# Patient Record
Sex: FEMALE | URBAN - METROPOLITAN AREA
[De-identification: names, ages, dates, MRNs, and addresses within clinical notes are randomized per-mention and may not be internally consistent; named-entity substitution may affect disease eponyms.]

---

## 2019-02-05 ENCOUNTER — INPATIENT (INPATIENT)
Facility: HOSPITAL | Age: 54
LOS: 2 days | Discharge: HOME | DRG: 305 | End: 2019-02-08
Attending: INTERNAL MEDICINE | Admitting: INTERNAL MEDICINE
Payer: MEDICAID

## 2019-02-05 VITALS
TEMPERATURE: 98 F | WEIGHT: 167.99 LBS | RESPIRATION RATE: 17 BRPM | DIASTOLIC BLOOD PRESSURE: 86 MMHG | SYSTOLIC BLOOD PRESSURE: 185 MMHG | HEART RATE: 77 BPM | HEIGHT: 60 IN | OXYGEN SATURATION: 99 %

## 2019-02-05 DIAGNOSIS — I10 ESSENTIAL (PRIMARY) HYPERTENSION: ICD-10-CM

## 2019-02-05 DIAGNOSIS — I21.9 ACUTE MYOCARDIAL INFARCTION, UNSPECIFIED: ICD-10-CM

## 2019-02-05 DIAGNOSIS — Z95.5 PRESENCE OF CORONARY ANGIOPLASTY IMPLANT AND GRAFT: Chronic | ICD-10-CM

## 2019-02-05 DIAGNOSIS — G51.0 BELL'S PALSY: ICD-10-CM

## 2019-02-05 DIAGNOSIS — H53.8 OTHER VISUAL DISTURBANCES: ICD-10-CM

## 2019-02-05 DIAGNOSIS — Z29.9 ENCOUNTER FOR PROPHYLACTIC MEASURES, UNSPECIFIED: ICD-10-CM

## 2019-02-05 LAB
ALBUMIN SERPL ELPH-MCNC: 3.6 G/DL — SIGNIFICANT CHANGE UP (ref 3.5–5)
ALP SERPL-CCNC: 95 U/L — SIGNIFICANT CHANGE UP (ref 40–120)
ALT FLD-CCNC: 37 U/L DA — SIGNIFICANT CHANGE UP (ref 10–60)
ANION GAP SERPL CALC-SCNC: 8 MMOL/L — SIGNIFICANT CHANGE UP (ref 5–17)
APTT BLD: 29.7 SEC — SIGNIFICANT CHANGE UP (ref 27.5–36.3)
AST SERPL-CCNC: 31 U/L — SIGNIFICANT CHANGE UP (ref 10–40)
BASOPHILS # BLD AUTO: 0.1 K/UL — SIGNIFICANT CHANGE UP (ref 0–0.2)
BASOPHILS NFR BLD AUTO: 0.9 % — SIGNIFICANT CHANGE UP (ref 0–2)
BILIRUB SERPL-MCNC: 0.4 MG/DL — SIGNIFICANT CHANGE UP (ref 0.2–1.2)
BUN SERPL-MCNC: 6 MG/DL — LOW (ref 7–18)
CALCIUM SERPL-MCNC: 9 MG/DL — SIGNIFICANT CHANGE UP (ref 8.4–10.5)
CHLORIDE SERPL-SCNC: 101 MMOL/L — SIGNIFICANT CHANGE UP (ref 96–108)
CK MB BLD-MCNC: <4 % — HIGH (ref 0–3.5)
CK MB CFR SERPL CALC: <1 NG/ML — SIGNIFICANT CHANGE UP (ref 0–3.6)
CK SERPL-CCNC: 25 U/L — SIGNIFICANT CHANGE UP (ref 21–215)
CO2 SERPL-SCNC: 26 MMOL/L — SIGNIFICANT CHANGE UP (ref 22–31)
CREAT SERPL-MCNC: 0.82 MG/DL — SIGNIFICANT CHANGE UP (ref 0.5–1.3)
EOSINOPHIL # BLD AUTO: 0.1 K/UL — SIGNIFICANT CHANGE UP (ref 0–0.5)
EOSINOPHIL NFR BLD AUTO: 1.2 % — SIGNIFICANT CHANGE UP (ref 0–6)
GLUCOSE SERPL-MCNC: 373 MG/DL — HIGH (ref 70–99)
HCT VFR BLD CALC: 48.2 % — HIGH (ref 34.5–45)
HGB BLD-MCNC: 15.8 G/DL — HIGH (ref 11.5–15.5)
INR BLD: 1.04 RATIO — SIGNIFICANT CHANGE UP (ref 0.88–1.16)
LYMPHOCYTES # BLD AUTO: 3 K/UL — SIGNIFICANT CHANGE UP (ref 1–3.3)
LYMPHOCYTES # BLD AUTO: 33.5 % — SIGNIFICANT CHANGE UP (ref 13–44)
MCHC RBC-ENTMCNC: 28.4 PG — SIGNIFICANT CHANGE UP (ref 27–34)
MCHC RBC-ENTMCNC: 32.8 GM/DL — SIGNIFICANT CHANGE UP (ref 32–36)
MCV RBC AUTO: 86.6 FL — SIGNIFICANT CHANGE UP (ref 80–100)
MONOCYTES # BLD AUTO: 0.7 K/UL — SIGNIFICANT CHANGE UP (ref 0–0.9)
MONOCYTES NFR BLD AUTO: 8.2 % — SIGNIFICANT CHANGE UP (ref 2–14)
NEUTROPHILS # BLD AUTO: 5 K/UL — SIGNIFICANT CHANGE UP (ref 1.8–7.4)
NEUTROPHILS NFR BLD AUTO: 56.3 % — SIGNIFICANT CHANGE UP (ref 43–77)
PLATELET # BLD AUTO: 267 K/UL — SIGNIFICANT CHANGE UP (ref 150–400)
POTASSIUM SERPL-MCNC: 3.1 MMOL/L — LOW (ref 3.5–5.3)
POTASSIUM SERPL-SCNC: 3.1 MMOL/L — LOW (ref 3.5–5.3)
PROT SERPL-MCNC: 8.2 G/DL — SIGNIFICANT CHANGE UP (ref 6–8.3)
PROTHROM AB SERPL-ACNC: 11.6 SEC — SIGNIFICANT CHANGE UP (ref 10–12.9)
RBC # BLD: 5.57 M/UL — HIGH (ref 3.8–5.2)
RBC # FLD: 12.2 % — SIGNIFICANT CHANGE UP (ref 10.3–14.5)
SODIUM SERPL-SCNC: 135 MMOL/L — SIGNIFICANT CHANGE UP (ref 135–145)
TROPONIN I SERPL-MCNC: 0.02 NG/ML — SIGNIFICANT CHANGE UP (ref 0–0.04)
WBC # BLD: 9 K/UL — SIGNIFICANT CHANGE UP (ref 3.8–10.5)
WBC # FLD AUTO: 9 K/UL — SIGNIFICANT CHANGE UP (ref 3.8–10.5)

## 2019-02-05 PROCEDURE — 70450 CT HEAD/BRAIN W/O DYE: CPT | Mod: 26

## 2019-02-05 PROCEDURE — 99285 EMERGENCY DEPT VISIT HI MDM: CPT

## 2019-02-05 RX ORDER — ATORVASTATIN CALCIUM 80 MG/1
10 TABLET, FILM COATED ORAL AT BEDTIME
Qty: 0 | Refills: 0 | Status: DISCONTINUED | OUTPATIENT
Start: 2019-02-05 | End: 2019-02-05

## 2019-02-05 RX ORDER — ATORVASTATIN CALCIUM 80 MG/1
40 TABLET, FILM COATED ORAL AT BEDTIME
Qty: 0 | Refills: 0 | Status: DISCONTINUED | OUTPATIENT
Start: 2019-02-05 | End: 2019-02-08

## 2019-02-05 RX ORDER — ASPIRIN/CALCIUM CARB/MAGNESIUM 324 MG
81 TABLET ORAL DAILY
Qty: 0 | Refills: 0 | Status: DISCONTINUED | OUTPATIENT
Start: 2019-02-05 | End: 2019-02-08

## 2019-02-05 RX ORDER — ASPIRIN/CALCIUM CARB/MAGNESIUM 324 MG
325 TABLET ORAL ONCE
Qty: 0 | Refills: 0 | Status: COMPLETED | OUTPATIENT
Start: 2019-02-05 | End: 2019-02-05

## 2019-02-05 RX ORDER — HEPARIN SODIUM 5000 [USP'U]/ML
5000 INJECTION INTRAVENOUS; SUBCUTANEOUS EVERY 8 HOURS
Qty: 0 | Refills: 0 | Status: DISCONTINUED | OUTPATIENT
Start: 2019-02-05 | End: 2019-02-08

## 2019-02-05 RX ADMIN — HEPARIN SODIUM 5000 UNIT(S): 5000 INJECTION INTRAVENOUS; SUBCUTANEOUS at 22:12

## 2019-02-05 RX ADMIN — Medication 325 MILLIGRAM(S): at 18:58

## 2019-02-05 RX ADMIN — ATORVASTATIN CALCIUM 40 MILLIGRAM(S): 80 TABLET, FILM COATED ORAL at 22:12

## 2019-02-05 NOTE — H&P ADULT - NEGATIVE OPHTHALMOLOGIC SYMPTOMS
no photophobia/no discharge R/no discharge L/no lacrimation L/no lacrimation R/no loss of vision L/no blurred vision L/no loss of vision R

## 2019-02-05 NOTE — ED PROVIDER NOTE - MEDICAL DECISION MAKING DETAILS
52 y/o F presents with blurry vision and strabismus on examination. Will obtain CT head, labs, discuss with neurology and reassess.

## 2019-02-05 NOTE — H&P ADULT - HISTORY OF PRESENT ILLNESS
This is a 54 y/o female, from home lives with daughter ambulates independently, with PMH of R sided Bell's Palsy in 2017 with some residual deficit, MI in 2004 s/p stent, HLD, HTN, noncompliant with medications and no PCP, who p/w change in right sided vision for 3 days. She says that on Sunday she noticed she was unable to focus her vision due to inability to focus her right eye. She reports that on Saturday she had a headache for which she took tylenol and it went away, but then she noticed the vision change the following day. She reports that the inability to focus her right eye makes her feel off balance. She says that she has not had any problems with her left eye. She does not notice a change in her appearance of her face. She denies any associated pain, loss of sensation of the face or elsewhere on the body, numbness, weakness, tingling, LOC, falls, dizziness, headaches aside from Saturday, nausea, vomiting, diarrhea, chest pain, SOB, fevers, chills, trauma, recent illness. She reports that she used to smoke 1 pack of cigarettes per week for 10 years until 2004. She reports that she drinks alcohol in moderation socially and her last drink was 3 weeks ago.

## 2019-02-05 NOTE — H&P ADULT - NEGATIVE NEUROLOGICAL SYMPTOMS
no weakness/no syncope/no vertigo/no loss of sensation/no loss of consciousness/no transient paralysis/no headache

## 2019-02-05 NOTE — H&P ADULT - PROBLEM SELECTOR PLAN 1
R sided vision change, inability to focus, x3 days  - May be secondary to brainstem stroke  - Right sided facial droop, may be due to residual deficits from Bell's palsy in 2017  - Admit to tele for concern of stroke  - CT head negative  - Start on aspirin, statin  - F/u cardiac enzymes  - F/u echo  - Neuro Dr Reis  - Cardio Dr Carter R sided vision change, inability to focus, x3 days  - May be secondary to brainstem stroke  - Right sided facial droop, may be due to residual deficits from Bell's palsy in 2017  - Admit to tele for concern of stroke  - Permissive HTN to 220/110 for 48 hours  - CT head negative  - Start on aspirin, statin  - F/u cardiac enzymes  - F/u carotid doppler  - F/u echo  - Neuro Dr Reis  - Cardio Dr Carter

## 2019-02-05 NOTE — ED PROVIDER NOTE - NS ED ROS FT
CONSTITUTIONAL: no fever, no chills   EYES: +visual changes, no eye pain   ENMT: no nasal congestion, no throat pain  CARDIOVASCULAR: no chest pain, no edema, no palpitations   RESPIRATORY: no shortness of breath, no cough   GASTROINTESTINAL: no abdominal pain, no nausea, no vomiting, no diarrhea, no constipation   GENITOURINARY: no dysuria, no frequency  MUSCULOSKELETAL: no joint pains, no myalgias, no back pain   SKIN: no rashes  NEUROLOGICAL: no weakness, no headache, no dizziness, no slurred speech, no syncope   PSYCHIATRIC: no known mental health illness   HEME/LYMPH: no lymphadenopathy      All other ROS negative except as per HPI

## 2019-02-05 NOTE — ED PROVIDER NOTE - OBJECTIVE STATEMENT
52 y/o F with PMHx of R sided bell's palsy, CAD with stents presents to the ED with complaints of blurry vision x 2 days. Patient states that vision is normal when looking from one eye, but blurry when looking for both. Symptoms have been constant since onset and is associated with feeling unsteady with walking. Patient denies headache, eye pain, slurred speech, syncope, trauma or any other acute complaints. NKDA. 54 y/o F with PMHx of R sided bell's palsy, CAD with stents presents to the ED with complaints of blurry vision x 2 days. Patient states that vision is normal when looking from one eye, but blurry when looking for both. Symptoms have been constant since onset and is associated with feeling unsteady with walking. Patient denies headache, eye pain, slurred speech, syncope, trauma or any other acute complaints.

## 2019-02-05 NOTE — H&P ADULT - PROBLEM SELECTOR PLAN 2
History of Bell's palsy in 2017  - Patient believes she may have some residual deficits, unclear if current facial droop is due to Bell's palsy vs new stroke  - Neuro Dr Reis

## 2019-02-05 NOTE — H&P ADULT - PROBLEM SELECTOR PLAN 4
History of HTN, not on meds, does not have PCP  - BP elevated on presentation  - Cardio Dr Carter History of HTN, not on meds, does not have PCP  - BP elevated on presentation  - Permissive HTN for now  - Cardio Dr Carter

## 2019-02-05 NOTE — H&P ADULT - PROBLEM SELECTOR PLAN 3
History of MI in 2004, s/p stents  - Noncompliant with medications  - Start on aspirin, statin  - Cardio Dr Carter History of MI in 2004, s/p stents  - Noncompliant with medications  - Start on aspirin, statin, hold off on plavix for now pending stroke w/u  - F/u echo  - Cardio Dr Carter

## 2019-02-05 NOTE — H&P ADULT - ASSESSMENT
This is a 52 y/o female, from home lives with daughter ambulates independently, with PMH of R sided Bell's Palsy in 2017 with some residual deficit, MI in 2004 s/p stent, HLD, HTN, noncompliant with medications and no PCP, who p/w change in right sided vision for 3 days. She says that on Sunday she noticed she was unable to focus her vision due to inability to focus her right eye. She reports that on Saturday she had a headache for which she took tylenol and it went away, but then she noticed the vision change the following day. She reports that the inability to focus her right eye makes her feel off balance. She says that she has not had any problems with her left eye. She does not notice a change in her appearance of her face. She denies any associated pain, loss of sensation of the face or elsewhere on the body, numbness, weakness, tingling, LOC, falls, dizziness, headaches aside from Saturday, nausea, vomiting, diarrhea, chest pain, SOB, fevers, chills, trauma, recent illness. She reports that she used to smoke 1 pack of cigarettes per week for 10 years until 2004. She reports that she drinks alcohol in moderation socially and her last drink was 3 weeks ago. This is a 52 y/o female, from home lives with daughter ambulates independently, with PMH of R sided Bell's Palsy in 2017 with some residual deficit, MI in 2004 s/p stent, HLD, HTN, noncompliant with medications and no PCP, who p/w change in right sided vision for 3 days. She says that on Sunday she noticed she was unable to focus her vision due to inability to focus her right eye. She reports that on Saturday she had a headache for which she took tylenol and it went away, but then she noticed the vision change the following day. She reports that the inability to focus her right eye makes her feel off balance. She says that she has not had any problems with her left eye. She does not notice a change in her appearance of her face. She denies any associated pain, loss of sensation of the face or elsewhere on the body, numbness, weakness, tingling, LOC, falls, dizziness, headaches aside from Saturday, nausea, vomiting, diarrhea, chest pain, SOB, fevers, chills, trauma, recent illness. She reports that she used to smoke 1 pack of cigarettes per week for 10 years until 2004. She reports that she drinks alcohol in moderation socially and her last drink was 3 weeks ago.    Admitting patient to telemetry to r/o brainstem stroke.

## 2019-02-05 NOTE — H&P ADULT - PROBLEM SELECTOR PLAN 5
IMPROVE VTE Individual Risk Assessment    RISK                                                          Points  [] Previous VTE                                          3  [] Thrombophilia                                         2  [] Lower limb paralysis                                2   [] Current Cancer                                        2   [] Immobilization > 24 hrs                          1  [] ICU/CCU stay > 24 hours                          1  [] Age > 60                                                1    Improve Score 0.  No DVT ppx indicated for now. IMPROVE VTE Individual Risk Assessment    RISK                                                          Points  [] Previous VTE                                          3  [] Thrombophilia                                         2  [] Lower limb paralysis                                2   [] Current Cancer                                        2   [x] Immobilization > 24 hrs                          1  [] ICU/CCU stay > 24 hours                          1  [] Age > 60                                                1    Improve Score 1.  HSQ for DVT ppx.

## 2019-02-05 NOTE — ED PROVIDER NOTE - PROGRESS NOTE DETAILS
labs - hyperglycemia, borderline hypoK  CT head - no hemorrhage or acute infarct   Discussed case with Dr Reis / neuro; states symptoms may be due to brain stem infarct. Will admit to unattached / Dr Rosen for further management. Given aspirin. Passes dysphagia. NIH 0. Endorsed to MAR.

## 2019-02-05 NOTE — H&P ADULT - NSHPSOCIALHISTORY_GEN_ALL_CORE
From home lives with daughter, ambulates independently.  Uninsured, no PCP, noncompliant with meds.  1 pack per week smoking history for 10 years until 2004.  Social drinker, last drink 3 weeks ago.

## 2019-02-06 DIAGNOSIS — E11.9 TYPE 2 DIABETES MELLITUS WITHOUT COMPLICATIONS: ICD-10-CM

## 2019-02-06 DIAGNOSIS — E03.9 HYPOTHYROIDISM, UNSPECIFIED: ICD-10-CM

## 2019-02-06 LAB
ANION GAP SERPL CALC-SCNC: 8 MMOL/L — SIGNIFICANT CHANGE UP (ref 5–17)
BUN SERPL-MCNC: 5 MG/DL — LOW (ref 7–18)
CALCIUM SERPL-MCNC: 8.5 MG/DL — SIGNIFICANT CHANGE UP (ref 8.4–10.5)
CHLORIDE SERPL-SCNC: 100 MMOL/L — SIGNIFICANT CHANGE UP (ref 96–108)
CHOLEST SERPL-MCNC: 176 MG/DL — SIGNIFICANT CHANGE UP (ref 10–199)
CK MB BLD-MCNC: <3.8 % — HIGH (ref 0–3.5)
CK MB CFR SERPL CALC: <1 NG/ML — SIGNIFICANT CHANGE UP (ref 0–3.6)
CK SERPL-CCNC: 26 U/L — SIGNIFICANT CHANGE UP (ref 21–215)
CO2 SERPL-SCNC: 28 MMOL/L — SIGNIFICANT CHANGE UP (ref 22–31)
CREAT SERPL-MCNC: 0.71 MG/DL — SIGNIFICANT CHANGE UP (ref 0.5–1.3)
GLUCOSE SERPL-MCNC: 253 MG/DL — HIGH (ref 70–99)
HBA1C BLD-MCNC: 13.1 % — HIGH (ref 4–5.6)
HCT VFR BLD CALC: 46.2 % — HIGH (ref 34.5–45)
HCV AB S/CO SERPL IA: 0.21 S/CO — SIGNIFICANT CHANGE UP
HCV AB SERPL-IMP: SIGNIFICANT CHANGE UP
HDLC SERPL-MCNC: 32 MG/DL — LOW
HGB BLD-MCNC: 15.2 G/DL — SIGNIFICANT CHANGE UP (ref 11.5–15.5)
LIPID PNL WITH DIRECT LDL SERPL: 110 MG/DL — SIGNIFICANT CHANGE UP
MAGNESIUM SERPL-MCNC: 2 MG/DL — SIGNIFICANT CHANGE UP (ref 1.6–2.6)
MCHC RBC-ENTMCNC: 29 PG — SIGNIFICANT CHANGE UP (ref 27–34)
MCHC RBC-ENTMCNC: 32.8 GM/DL — SIGNIFICANT CHANGE UP (ref 32–36)
MCV RBC AUTO: 88.2 FL — SIGNIFICANT CHANGE UP (ref 80–100)
PHOSPHATE SERPL-MCNC: 2.6 MG/DL — SIGNIFICANT CHANGE UP (ref 2.5–4.5)
PLATELET # BLD AUTO: 237 K/UL — SIGNIFICANT CHANGE UP (ref 150–400)
POTASSIUM SERPL-MCNC: 3.4 MMOL/L — LOW (ref 3.5–5.3)
POTASSIUM SERPL-SCNC: 3.4 MMOL/L — LOW (ref 3.5–5.3)
RBC # BLD: 5.24 M/UL — HIGH (ref 3.8–5.2)
RBC # FLD: 11.9 % — SIGNIFICANT CHANGE UP (ref 10.3–14.5)
SODIUM SERPL-SCNC: 136 MMOL/L — SIGNIFICANT CHANGE UP (ref 135–145)
TOTAL CHOLESTEROL/HDL RATIO MEASUREMENT: 5.5 RATIO — SIGNIFICANT CHANGE UP (ref 3.3–7.1)
TRIGL SERPL-MCNC: 169 MG/DL — HIGH (ref 10–149)
TROPONIN I SERPL-MCNC: 0.03 NG/ML — SIGNIFICANT CHANGE UP (ref 0–0.04)
TSH SERPL-MCNC: 19.3 UU/ML — HIGH (ref 0.34–4.82)
WBC # BLD: 8.9 K/UL — SIGNIFICANT CHANGE UP (ref 3.8–10.5)
WBC # FLD AUTO: 8.9 K/UL — SIGNIFICANT CHANGE UP (ref 3.8–10.5)

## 2019-02-06 PROCEDURE — 99222 1ST HOSP IP/OBS MODERATE 55: CPT

## 2019-02-06 PROCEDURE — 93880 EXTRACRANIAL BILAT STUDY: CPT | Mod: 26

## 2019-02-06 RX ORDER — INSULIN LISPRO 100/ML
VIAL (ML) SUBCUTANEOUS
Qty: 0 | Refills: 0 | Status: DISCONTINUED | OUTPATIENT
Start: 2019-02-06 | End: 2019-02-08

## 2019-02-06 RX ORDER — LISINOPRIL 2.5 MG/1
10 TABLET ORAL DAILY
Qty: 0 | Refills: 0 | Status: DISCONTINUED | OUTPATIENT
Start: 2019-02-06 | End: 2019-02-07

## 2019-02-06 RX ORDER — HYDROCHLOROTHIAZIDE 25 MG
25 TABLET ORAL DAILY
Qty: 0 | Refills: 0 | Status: DISCONTINUED | OUTPATIENT
Start: 2019-02-06 | End: 2019-02-07

## 2019-02-06 RX ORDER — POTASSIUM CHLORIDE 20 MEQ
20 PACKET (EA) ORAL DAILY
Qty: 0 | Refills: 0 | Status: DISCONTINUED | OUTPATIENT
Start: 2019-02-06 | End: 2019-02-08

## 2019-02-06 RX ADMIN — Medication 25 MILLIGRAM(S): at 17:44

## 2019-02-06 RX ADMIN — Medication 20 MILLIEQUIVALENT(S): at 11:18

## 2019-02-06 RX ADMIN — ATORVASTATIN CALCIUM 40 MILLIGRAM(S): 80 TABLET, FILM COATED ORAL at 22:04

## 2019-02-06 RX ADMIN — Medication 2: at 17:17

## 2019-02-06 RX ADMIN — HEPARIN SODIUM 5000 UNIT(S): 5000 INJECTION INTRAVENOUS; SUBCUTANEOUS at 13:12

## 2019-02-06 RX ADMIN — Medication 81 MILLIGRAM(S): at 11:54

## 2019-02-06 RX ADMIN — Medication 3: at 11:55

## 2019-02-06 RX ADMIN — LISINOPRIL 10 MILLIGRAM(S): 2.5 TABLET ORAL at 17:44

## 2019-02-06 RX ADMIN — HEPARIN SODIUM 5000 UNIT(S): 5000 INJECTION INTRAVENOUS; SUBCUTANEOUS at 22:05

## 2019-02-06 RX ADMIN — HEPARIN SODIUM 5000 UNIT(S): 5000 INJECTION INTRAVENOUS; SUBCUTANEOUS at 07:20

## 2019-02-06 RX ADMIN — Medication 1 DROP(S): at 17:44

## 2019-02-06 NOTE — PROGRESS NOTE ADULT - PROBLEM SELECTOR PLAN 4
History of HTN, not on meds, does not have PCP  - BP elevated on presentation  - started on lisinopril and HCTZ  - Cardio Dr Carter

## 2019-02-06 NOTE — CONSULT NOTE ADULT - SUBJECTIVE AND OBJECTIVE BOX
Patient is a 53y old  Female who presents with a chief complaint of change in vision of R eye x3 days (06 Feb 2019 15:47)      HPI:    PAST MEDICAL & SURGICAL HISTORY:  Hypertension  Myocardial infarction  Bell's palsy  Coronary artery disease: 4 cardiac stents  H/O heart artery stent      FAMILY HISTORY:  No pertinent family history        Social Hx:  Nonsmoker, no drug or alcohol use    MEDICATIONS  (STANDING):  artificial  tears Solution 1 Drop(s) Both EYES daily  aspirin  chewable 81 milliGRAM(s) Oral daily  atorvastatin 40 milliGRAM(s) Oral at bedtime  heparin  Injectable 5000 Unit(s) SubCutaneous every 8 hours  hydrochlorothiazide 25 milliGRAM(s) Oral daily  insulin lispro (HumaLOG) corrective regimen sliding scale   SubCutaneous three times a day before meals  lisinopril 10 milliGRAM(s) Oral daily  potassium chloride    Tablet ER 20 milliEquivalent(s) Oral daily       Allergies    No Known Allergies    Intolerances        ROS: Pertinent positives in HPI, all other ROS were reviewed and are negative.      Vital Signs Last 24 Hrs  T(C): 36.7 (06 Feb 2019 15:18), Max: 36.9 (06 Feb 2019 11:08)  T(F): 98 (06 Feb 2019 15:18), Max: 98.4 (06 Feb 2019 11:08)  HR: 83 (06 Feb 2019 15:18) (57 - 85)  BP: 187/88 (06 Feb 2019 15:18) (165/87 - 187/88)  BP(mean): --  RR: 17 (06 Feb 2019 15:18) (17 - 18)  SpO2: 100% (06 Feb 2019 15:18) (97% - 100%)        Constitutional: awake and alert.  HEENT: PERRLA, EOMI,   Neck: Supple.  Respiratory: Breath sounds are clear bilaterally  Cardiovascular: S1 and S2, regular / irregular rhythm  Gastrointestinal: soft, nontender  Extremities:  no edema  Vascular: Caritid Bruit - no  Musculoskeletal: no joint swelling/tenderness, no abnormal movements  Skin: No rashes    Neurological exam:  HF: A x O x 3. Appropriately interactive, normal affect. Speech fluent, No Aphasia or paraphasic errors. Naming /repetition intact   CN: ANA PAULA, EOM Left internuclear ophthalmoplegia with abducting OD nystagmus and skew; ptosis OD, VFF, facial sensation normal, right peripheral type facial weakness tongue midline, Palate moves equally, SCM equal bilaterally  Motor: No pronator drift, Strength 5/5 in all 4 ext, normal bulk and tone, no tremor, rigidity or bradykinesia.    Sens: Intact to light touch / PP/ VS/ JS    Reflexes: Symmetric and normal . BJ 2+, BR 2+, KJ 1+, AJ 1+, downgoing toes b/l  Coord:  No FNFA, dysmetria, HUSSAIN intact   Gait/Balance: Normal    NIHSS: 0  MRS 6          Labs:                        15.2   8.9   )-----------( 237      ( 06 Feb 2019 08:26 )             46.2     02-06    136  |  100  |  5<L>  ----------------------------<  253<H>  3.4<L>   |  28  |  0.71    Ca    8.5      06 Feb 2019 08:26  Phos  2.6     02-06  Mg     2.0     02-06    TPro  8.2  /  Alb  3.6  /  TBili  0.4  /  DBili  x   /  AST  31  /  ALT  37  /  AlkPhos  95  02-05    02-06 [id number] 13.1    02-06 Chol 176  HDL 32<L> Trig 169<H>  PT/INR - ( 05 Feb 2019 15:19 )   PT: 11.6 sec;   INR: 1.04 ratio         PTT - ( 05 Feb 2019 15:19 )  PTT:29.7 sec    Radiology report:  - CT head:  < from: CT Head No Cont (02.05.19 @ 15:29) >  EXAM:  CT BRAIN                            PROCEDURE DATE:  02/05/2019          INTERPRETATION:  CT HEAD WITHOUT CONTRAST    INDICATION: 53 years old. Female. strabismus, blurry vision.    COMPARISON: None available.    TECHNIQUE: Noncontrast axialCT head was obtained from the skull base to   vertex.    FINDINGS:  No acute intracranial hemorrhage, mass effect or midline shift.  No CT evidence of acute large territory vascular infarct.  The ventricles and cortical sulci are within normal limits.    The visualized paranasal sinuses and mastoid air cells are well aerated.  No displaced calvarial fracture.    IMPRESSION:   No acute intracranial hemorrhage or mass effect.    EVELYN VEGA M.D., ATTENDING RADIOLOGIST     end of copied text >

## 2019-02-06 NOTE — PROGRESS NOTE ADULT - SUBJECTIVE AND OBJECTIVE BOX
Patient is a 53y old  Female who presents with a chief complaint of change in vision of R eye x3 days (05 Feb 2019 19:33)    pt seen in tele [ x ], reg med floor [   ], bed [ x ], chair at bedside [   ], a+o x3 [x  ], lethargic [  ],  nad [x  ]          Allergies    No Known Allergies        Vitals    T(F): 98.2 (02-06-19 @ 07:39), Max: 98.9 (02-05-19 @ 16:00)  HR: 60 (02-06-19 @ 07:39) (57 - 85)  BP: 165/87 (02-06-19 @ 07:39) (163/97 - 185/86)  RR: 18 (02-06-19 @ 07:39) (17 - 18)  SpO2: 98% (02-06-19 @ 07:39) (97% - 99%)  Wt(kg): --  CAPILLARY BLOOD GLUCOSE          Labs                          15.8   9.0   )-----------( 267      ( 05 Feb 2019 15:19 )             48.2       02-05    135  |  101  |  6<L>  ----------------------------<  373<H>  3.1<L>   |  26  |  0.82    Ca    9.0      05 Feb 2019 15:19    TPro  8.2  /  Alb  3.6  /  TBili  0.4  /  DBili  x   /  AST  31  /  ALT  37  /  AlkPhos  95  02-05      CARDIAC MARKERS ( 05 Feb 2019 21:45 )  0.024 ng/mL / x     / 25 U/L / x     / <1.0 ng/mL            Radiology Results    < from: CT Head No Cont (02.05.19 @ 15:29) >  IMPRESSION:   No acute intracranial hemorrhage or mass effect.    < end of copied text >        Meds    MEDICATIONS  (STANDING):  aspirin  chewable 81 milliGRAM(s) Oral daily  atorvastatin 40 milliGRAM(s) Oral at bedtime  heparin  Injectable 5000 Unit(s) SubCutaneous every 8 hours      MEDICATIONS  (PRN):      Physical Exam    Neuro :  right facial droop  Respiratory: CTA B/L  CV: RRR, S1S2, no murmurs,   Abdominal: Soft, NT, ND +BS,  Extremities: No edema, + peripheral pulses    ASSESSMENT    Other specified visual disturbances  r/o cva  h/o Hypertension  Myocardial infarction  Bell's palsy  History of hypertension  Coronary artery disease  Bell palsy  H/O heart artery stent      PLAN      cont tele,   aspirin, statin,   neuro cons  f/u mri brain  cardio cons  ce q8 x  1 neg noted above  f/u echo  supplement potassium for hypokalemia  cont current meds

## 2019-02-06 NOTE — PROGRESS NOTE ADULT - SUBJECTIVE AND OBJECTIVE BOX
PGY 1 Note discussed with supervising resident and primary attending    Patient is a 53y old  Female who presents with a chief complaint of change in vision of R eye x3 days (06 Feb 2019 08:15)      INTERVAL HPI/OVERNIGHT EVENTS: no acute overnight events, she says blurring of vision resolved    MEDICATIONS  (STANDING):  aspirin  chewable 81 milliGRAM(s) Oral daily  atorvastatin 40 milliGRAM(s) Oral at bedtime  heparin  Injectable 5000 Unit(s) SubCutaneous every 8 hours  insulin lispro (HumaLOG) corrective regimen sliding scale   SubCutaneous three times a day before meals  potassium chloride    Tablet ER 20 milliEquivalent(s) Oral daily    MEDICATIONS  (PRN):      __________________________________________________  REVIEW OF SYSTEMS:    CONSTITUTIONAL: No fever,   EYES: no acute visual disturbances  NECK: No pain or stiffness  RESPIRATORY: No cough; No shortness of breath  CARDIOVASCULAR: No chest pain, no palpitations  GASTROINTESTINAL: No pain. No nausea or vomiting; No diarrhea   NEUROLOGICAL: No headache or numbness, no tremors  MUSCULOSKELETAL: No joint pain, no muscle pain  GENITOURINARY: no dysuria, no frequency, no hesitancy  PSYCHIATRY: no depression , no anxiety  ALL OTHER  ROS negative        Vital Signs Last 24 Hrs  T(C): 36.7 (06 Feb 2019 15:18), Max: 37.2 (05 Feb 2019 16:00)  T(F): 98 (06 Feb 2019 15:18), Max: 98.9 (05 Feb 2019 16:00)  HR: 83 (06 Feb 2019 15:18) (57 - 85)  BP: 187/88 (06 Feb 2019 15:18) (163/97 - 187/88)  BP(mean): --  RR: 17 (06 Feb 2019 15:18) (17 - 18)  SpO2: 100% (06 Feb 2019 15:18) (97% - 100%)    ________________________________________________  PHYSICAL EXAM:  GENERAL: NAD  HEENT: Normocephalic;  conjunctivae and sclerae clear; moist mucous membranes;   NECK : supple  CHEST/LUNG: Clear to auscultation bilaterally with good air entry   HEART: S1 S2  regular; no murmurs, gallops or rubs  ABDOMEN: Soft, Nontender, Nondistended; Bowel sounds present  EXTREMITIES: no cyanosis; no edema; no calf tenderness  SKIN: warm and dry; no rash  NERVOUS SYSTEM:  Awake and alert; Oriented  to place, person and time ; no new deficits, Rt. sided facial palsy    _________________________________________________  LABS:                        15.2   8.9   )-----------( 237      ( 06 Feb 2019 08:26 )             46.2     02-06    136  |  100  |  5<L>  ----------------------------<  253<H>  3.4<L>   |  28  |  0.71    Ca    8.5      06 Feb 2019 08:26  Phos  2.6     02-06  Mg     2.0     02-06    TPro  8.2  /  Alb  3.6  /  TBili  0.4  /  DBili  x   /  AST  31  /  ALT  37  /  AlkPhos  95  02-05    PT/INR - ( 05 Feb 2019 15:19 )   PT: 11.6 sec;   INR: 1.04 ratio         PTT - ( 05 Feb 2019 15:19 )  PTT:29.7 sec    CAPILLARY BLOOD GLUCOSE            RADIOLOGY & ADDITIONAL TESTS:    Imaging Personally Reviewed:  YES    Consultant(s) Notes Reviewed:   YES    Care Discussed with Consultants : YES    Plan of care was discussed with patient and /or primary care giver; all questions and concerns were addressed and care was aligned with patient's wishes.

## 2019-02-06 NOTE — PROGRESS NOTE ADULT - PROBLEM SELECTOR PLAN 1
R sided vision change, inability to focus, x3 days  - May be secondary to brainstem stroke  - Right sided facial droop, may be due to residual deficits from Bell's palsy in 2017  - Admit to tele for concern of stroke  - CT head negative  - Start on aspirin, statin  - normal cardiac enzymes  - carotid doppler shows irregular arterial pulse  - F/u echo  - Neuro Dr Reis  - Cardio Dr Carter

## 2019-02-07 LAB
ANION GAP SERPL CALC-SCNC: 8 MMOL/L — SIGNIFICANT CHANGE UP (ref 5–17)
BUN SERPL-MCNC: 10 MG/DL — SIGNIFICANT CHANGE UP (ref 7–18)
CALCIUM SERPL-MCNC: 8.6 MG/DL — SIGNIFICANT CHANGE UP (ref 8.4–10.5)
CHLORIDE SERPL-SCNC: 101 MMOL/L — SIGNIFICANT CHANGE UP (ref 96–108)
CO2 SERPL-SCNC: 25 MMOL/L — SIGNIFICANT CHANGE UP (ref 22–31)
CREAT SERPL-MCNC: 0.7 MG/DL — SIGNIFICANT CHANGE UP (ref 0.5–1.3)
GLUCOSE BLDC GLUCOMTR-MCNC: 211 MG/DL — HIGH (ref 70–99)
GLUCOSE BLDC GLUCOMTR-MCNC: 227 MG/DL — HIGH (ref 70–99)
GLUCOSE BLDC GLUCOMTR-MCNC: 227 MG/DL — HIGH (ref 70–99)
GLUCOSE BLDC GLUCOMTR-MCNC: 240 MG/DL — HIGH (ref 70–99)
GLUCOSE SERPL-MCNC: 249 MG/DL — HIGH (ref 70–99)
HCT VFR BLD CALC: 46.6 % — HIGH (ref 34.5–45)
HGB BLD-MCNC: 15.2 G/DL — SIGNIFICANT CHANGE UP (ref 11.5–15.5)
MCHC RBC-ENTMCNC: 28.8 PG — SIGNIFICANT CHANGE UP (ref 27–34)
MCHC RBC-ENTMCNC: 32.5 GM/DL — SIGNIFICANT CHANGE UP (ref 32–36)
MCV RBC AUTO: 88.4 FL — SIGNIFICANT CHANGE UP (ref 80–100)
PLATELET # BLD AUTO: 257 K/UL — SIGNIFICANT CHANGE UP (ref 150–400)
POTASSIUM SERPL-MCNC: 3.3 MMOL/L — LOW (ref 3.5–5.3)
POTASSIUM SERPL-SCNC: 3.3 MMOL/L — LOW (ref 3.5–5.3)
RBC # BLD: 5.27 M/UL — HIGH (ref 3.8–5.2)
RBC # FLD: 12.3 % — SIGNIFICANT CHANGE UP (ref 10.3–14.5)
SODIUM SERPL-SCNC: 134 MMOL/L — LOW (ref 135–145)
T3 SERPL-MCNC: 121 NG/DL — SIGNIFICANT CHANGE UP (ref 80–200)
T4 AB SER-ACNC: 6.5 UG/DL — SIGNIFICANT CHANGE UP (ref 4.6–12)
WBC # BLD: 11.5 K/UL — HIGH (ref 3.8–10.5)
WBC # FLD AUTO: 11.5 K/UL — HIGH (ref 3.8–10.5)

## 2019-02-07 RX ORDER — LISINOPRIL 2.5 MG/1
10 TABLET ORAL
Qty: 0 | Refills: 0 | Status: DISCONTINUED | OUTPATIENT
Start: 2019-02-07 | End: 2019-02-08

## 2019-02-07 RX ORDER — HYDROCHLOROTHIAZIDE 25 MG
12.5 TABLET ORAL DAILY
Qty: 0 | Refills: 0 | Status: DISCONTINUED | OUTPATIENT
Start: 2019-02-07 | End: 2019-02-08

## 2019-02-07 RX ORDER — ACETAMINOPHEN 500 MG
650 TABLET ORAL EVERY 6 HOURS
Qty: 0 | Refills: 0 | Status: DISCONTINUED | OUTPATIENT
Start: 2019-02-07 | End: 2019-02-08

## 2019-02-07 RX ORDER — POTASSIUM CHLORIDE 20 MEQ
40 PACKET (EA) ORAL ONCE
Qty: 0 | Refills: 0 | Status: DISCONTINUED | OUTPATIENT
Start: 2019-02-07 | End: 2019-02-08

## 2019-02-07 RX ORDER — INSULIN GLARGINE 100 [IU]/ML
10 INJECTION, SOLUTION SUBCUTANEOUS AT BEDTIME
Qty: 0 | Refills: 0 | Status: DISCONTINUED | OUTPATIENT
Start: 2019-02-07 | End: 2019-02-08

## 2019-02-07 RX ADMIN — Medication 650 MILLIGRAM(S): at 05:59

## 2019-02-07 RX ADMIN — Medication 2: at 17:02

## 2019-02-07 RX ADMIN — LISINOPRIL 10 MILLIGRAM(S): 2.5 TABLET ORAL at 06:00

## 2019-02-07 RX ADMIN — ATORVASTATIN CALCIUM 40 MILLIGRAM(S): 80 TABLET, FILM COATED ORAL at 21:31

## 2019-02-07 RX ADMIN — Medication 1 DROP(S): at 11:21

## 2019-02-07 RX ADMIN — HEPARIN SODIUM 5000 UNIT(S): 5000 INJECTION INTRAVENOUS; SUBCUTANEOUS at 14:38

## 2019-02-07 RX ADMIN — HEPARIN SODIUM 5000 UNIT(S): 5000 INJECTION INTRAVENOUS; SUBCUTANEOUS at 05:58

## 2019-02-07 RX ADMIN — Medication 81 MILLIGRAM(S): at 11:21

## 2019-02-07 RX ADMIN — Medication 2: at 11:59

## 2019-02-07 RX ADMIN — Medication 25 MILLIGRAM(S): at 05:58

## 2019-02-07 RX ADMIN — LISINOPRIL 10 MILLIGRAM(S): 2.5 TABLET ORAL at 17:02

## 2019-02-07 RX ADMIN — HEPARIN SODIUM 5000 UNIT(S): 5000 INJECTION INTRAVENOUS; SUBCUTANEOUS at 21:31

## 2019-02-07 RX ADMIN — INSULIN GLARGINE 10 UNIT(S): 100 INJECTION, SOLUTION SUBCUTANEOUS at 21:32

## 2019-02-07 RX ADMIN — Medication 2: at 08:20

## 2019-02-07 RX ADMIN — Medication 20 MILLIEQUIVALENT(S): at 11:21

## 2019-02-07 RX ADMIN — Medication 650 MILLIGRAM(S): at 06:52

## 2019-02-07 NOTE — DIETITIAN INITIAL EVALUATION ADULT. - PERTINENT LABORATORY DATA
reviewed   02-07 Na134 mmol/L<L> Glu 249 mg/dL<H> K+ 3.3 mmol/L<L> Cr  0.70 mg/dL BUN 10 mg/dL   [id number]=74.1

## 2019-02-07 NOTE — CONSULT NOTE ADULT - REASON FOR ADMISSION
change in vision of R eye x3 days

## 2019-02-07 NOTE — DISCHARGE NOTE ADULT - MEDICATION SUMMARY - MEDICATIONS TO TAKE
I will START or STAY ON the medications listed below when I get home from the hospital:    aspirin 81 mg oral tablet, chewable  -- 1 tab(s) by mouth once a day  -- Indication: For stroke    lisinopril 10 mg oral tablet  -- 1 tab(s) by mouth 2 times a day  -- Indication: For Hypertension    metFORMIN 500 mg oral tablet  -- 1 tab(s) by mouth once a day   -- Check with your doctor before becoming pregnant.  Do not drink alcoholic beverages when taking this medication.  It is very important that you take or use this exactly as directed.  Do not skip doses or discontinue unless directed by your doctor.  Obtain medical advice before taking any non-prescription drugs as some may affect the action of this medication.  Take with food or milk.    -- Indication: For Diabetes    glimepiride 4 mg oral tablet  -- 1 tab(s) by mouth once a day   -- Avoid prolonged or excessive exposure to direct and/or artificial sunlight while taking this medication.  Do not drink alcoholic beverages when taking this medication.  It is very important that you take or use this exactly as directed.  Do not skip doses or discontinue unless directed by your doctor.    -- Indication: For Diabetes    atorvastatin 40 mg oral tablet  -- 1 tab(s) by mouth once a day (at bedtime)  -- Indication: For Hyperlipidemia    hydroCHLOROthiazide 12.5 mg oral capsule  -- 1 cap(s) by mouth once a day  -- Indication: For Hypertension    levothyroxine 50 mcg (0.05 mg) oral tablet  -- 1 tab(s) by mouth once a day  -- Indication: For Hypothyroidism

## 2019-02-07 NOTE — CONSULT NOTE ADULT - SUBJECTIVE AND OBJECTIVE BOX
CHIEF COMPLAINT:Patient is a 53y old  Female who presents with a chief complaint of change in vision of R eye x3 days .      HPI:  This is a 52 y/o female, from home lives with daughter ambulates independently, with PMH of R sided Bell's Palsy in 2017 with some residual deficit, MI in 2004 s/p stent, HLD, HTN, noncompliant with medications and no PCP, who p/w change in right sided vision for 3 days. She says that on Sunday she noticed she was unable to focus her vision due to inability to focus her right eye. She reports that on Saturday she had a headache for which she took tylenol and it went away, but then she noticed the vision change the following day. She reports that the inability to focus her right eye makes her feel off balance. She says that she has not had any problems with her left eye. She does not notice a change in her appearance of her face. She denies any associated pain, loss of sensation of the face or elsewhere on the body, numbness, weakness, tingling, LOC, falls, dizziness, headaches aside from Saturday, nausea, vomiting, diarrhea, chest pain, SOB, fevers, chills, trauma, recent illness. She reports that she used to smoke 1 pack of cigarettes per week for 10 years until 2004. She reports that she drinks alcohol in moderation socially and her last drink was 3 weeks ago. (05 Feb 2019 19:33)      PAST MEDICAL & SURGICAL HISTORY:  Hypertension  Myocardial infarction  Bell's palsy  Coronary artery disease: 4 cardiac stents  H/O heart artery stent      MEDICATIONS  (STANDING):  artificial  tears Solution 1 Drop(s) Both EYES daily  aspirin  chewable 81 milliGRAM(s) Oral daily  atorvastatin 40 milliGRAM(s) Oral at bedtime  heparin  Injectable 5000 Unit(s) SubCutaneous every 8 hours  hydrochlorothiazide 25 milliGRAM(s) Oral daily  insulin lispro (HumaLOG) corrective regimen sliding scale   SubCutaneous three times a day before meals  lisinopril 10 milliGRAM(s) Oral daily  potassium chloride    Tablet ER 20 milliEquivalent(s) Oral daily  potassium chloride    Tablet ER 40 milliEquivalent(s) Oral once    MEDICATIONS  (PRN):  acetaminophen   Tablet .. 650 milliGRAM(s) Oral every 6 hours PRN Mild Pain (1 - 3)      FAMILY HISTORY: No hx of cad      SOCIAL HISTORY:    [ x] Non-smoker    [ x] Alcohol-denies    Allergies    No Known Allergies    Intolerances    	    REVIEW OF SYSTEMS:  CONSTITUTIONAL: No fever, weight loss, or fatigue  EYES: No eye pain, + visual disturbances, or discharge  ENT:  No difficulty hearing, tinnitus, vertigo; No sinus or throat pain  NECK: No pain or stiffness  RESPIRATORY: No cough, wheezing, chills or hemoptysis; No Shortness of Breath  CARDIOVASCULAR: No chest pain, palpitations, passing out, dizziness, or leg swelling  GASTROINTESTINAL: No abdominal or epigastric pain. No nausea, vomiting, or hematemesis; No diarrhea or constipation. No melena or hematochezia.  GENITOURINARY: No dysuria, frequency, hematuria, or incontinence  NEUROLOGICAL: No headaches, memory loss, loss of strength, numbness, or tremors  SKIN: No itching, burning, rashes, or lesions   LYMPH Nodes: No enlarged glands  ENDOCRINE: No heat or cold intolerance; No hair loss  MUSCULOSKELETAL: No joint pain or swelling; No muscle, back, or extremity pain  PSYCHIATRIC: No depression, anxiety, mood swings, or difficulty sleeping  HEME/LYMPH: No easy bruising, or bleeding gums  ALLERGY AND IMMUNOLOGIC: No hives or eczema	    PHYSICAL EXAM:  T(C): 36.5 (02-07-19 @ 07:36), Max: 36.9 (02-06-19 @ 11:08)  HR: 51 (02-07-19 @ 07:36) (51 - 83)  BP: 140/74 (02-07-19 @ 07:36) (120/69 - 187/88)  RR: 18 (02-07-19 @ 07:36) (17 - 18)  SpO2: 99% (02-07-19 @ 07:36) (98% - 100%)    I&O's Summary    06 Feb 2019 07:01  -  07 Feb 2019 07:00  --------------------------------------------------------  IN: 430 mL / OUT: 0 mL / NET: 430 mL    07 Feb 2019 07:01  -  07 Feb 2019 09:21  --------------------------------------------------------  IN: 118 mL / OUT: 0 mL / NET: 118 mL        Appearance: Normal	  HEENT:   Normal oral mucosa, PERRL, EOMI	  Lymphatic: No lymphadenopathy  Cardiovascular: Normal S1 S2, No JVD, No murmurs, No edema  Respiratory: Lungs clear to auscultation	  Psychiatry: A & O x 3, Mood & affect appropriate  Gastrointestinal:  Soft, Non-tender, + BS	  Skin: No rashes, No ecchymoses, No cyanosis	  Neurologic: Non-focal  Extremities: Normal range of motion, No clubbing, cyanosis or edema  Vascular: Peripheral pulses palpable 2+ bilaterally    	    ECG:  	nsr,q v1-v3    	  LABS:	 	    CARDIAC MARKERS:  CARDIAC MARKERS ( 06 Feb 2019 08:26 )  0.028 ng/mL / x     / 26 U/L / x     / <1.0 ng/mL  CARDIAC MARKERS ( 05 Feb 2019 21:45 )  0.024 ng/mL / x     / 25 U/L / x     / <1.0 ng/mL                              15.2   11.5  )-----------( 257      ( 07 Feb 2019 06:52 )             46.6     02-07    134<L>  |  101  |  10  ----------------------------<  249<H>  3.3<L>   |  25  |  0.70    Ca    8.6      07 Feb 2019 06:52  Phos  2.6     02-06  Mg     2.0     02-06    TPro  8.2  /  Alb  3.6  /  TBili  0.4  /  DBili  x   /  AST  31  /  ALT  37  /  AlkPhos  95  02-05    HgA1c: Hemoglobin A1C, Whole Blood: 13.1 % (02-06 @ 14:03)        EXAM:  CT BRAIN                            PROCEDURE DATE:  02/05/2019          INTERPRETATION:  CT HEAD WITHOUT CONTRAST    INDICATION: 53 years old. Female. strabismus, blurry vision.    COMPARISON: None available.    TECHNIQUE: Noncontrast axialCT head was obtained from the skull base to   vertex.    FINDINGS:  No acute intracranial hemorrhage, mass effect or midline shift.  No CT evidence of acute large territory vascular infarct.  The ventricles and cortical sulci are within normal limits.    The visualized paranasal sinuses and mastoid air cells are well aerated.  No displaced calvarial fracture.    IMPRESSION:   No acute intracranial hemorrhage or mass effect.

## 2019-02-07 NOTE — PROGRESS NOTE ADULT - SUBJECTIVE AND OBJECTIVE BOX
Patient is a 53y old  Female who presents with a chief complaint of change in vision of R eye x3 days (07 Feb 2019 09:20)    pt seen in tele [ x ], reg med floor [   ], bed [ x ], chair at bedside [   ], a+o x3 [x  ], lethargic [  ],  nad [x  ]      Allergies    No Known Allergies        Vitals    T(F): 97.6 (02-07-19 @ 11:32), Max: 98.5 (02-06-19 @ 19:27)  HR: 55 (02-07-19 @ 11:32) (51 - 83)  BP: 137/70 (02-07-19 @ 11:32) (120/69 - 187/88)  RR: 18 (02-07-19 @ 11:32) (17 - 18)  SpO2: 97% (02-07-19 @ 11:32) (97% - 100%)  Wt(kg): --  CAPILLARY BLOOD GLUCOSE      POCT Blood Glucose.: 240 mg/dL (07 Feb 2019 11:49)      Labs                          15.2   11.5  )-----------( 257      ( 07 Feb 2019 06:52 )             46.6       02-07    134<L>  |  101  |  10  ----------------------------<  249<H>  3.3<L>   |  25  |  0.70    Ca    8.6      07 Feb 2019 06:52  Phos  2.6     02-06  Mg     2.0     02-06    TPro  8.2  /  Alb  3.6  /  TBili  0.4  /  DBili  x   /  AST  31  /  ALT  37  /  AlkPhos  95  02-05    Hemoglobin A1C, Whole Blood: 13.1: Method: Immunoassay       Reference Range                4.0-5.6%       High risk (prediabetic)        5.7-6.4%       Diabetic, diagnostic             >=6.5%       ADA diabetic treatment goal       <7.0%  The Hemoglobin A1c testing is NGSP-certified.Reference ranges are based  upon the 2010 recommendations of  the American Diabetes Association.  Interpretation may vary for children  and adolescents. % (02.06.19 @ 14:03)        CARDIAC MARKERS ( 06 Feb 2019 08:26 )  0.028 ng/mL / x     / 26 U/L / x     / <1.0 ng/mL  CARDIAC MARKERS ( 05 Feb 2019 21:45 )  0.024 ng/mL / x     / 25 U/L / x     / <1.0 ng/mL      < from: Transthoracic Echocardiogram (02.06.19 @ 07:38) >  CONCLUSIONS:  TECHNICALLY VERY DIFFICULT STUDY, POOR ACOUSTIC WINDOWS    1. Mild mitral regurgitation.  2.  Mild aortic regurgitation.  3. Normal left ventricular internal dimensions and wall  thicknesses.  4. Endocardium not well visualized; grossly normal left  ventricular systolic function baaed on limited views.  Segmental wall motion could not be assessed.  5. Right ventricle not well visualized.  Probably normal  right ventricular size and systolic function.  < from: Transthoracic Echocardiogram (02.06.19 @ 07:38) >  Ejection Fraction Visual Estimate: >55 %    < end of copied text >    < end of copied text >        Radiology Results          Meds    MEDICATIONS  (STANDING):  artificial  tears Solution 1 Drop(s) Both EYES daily  aspirin  chewable 81 milliGRAM(s) Oral daily  atorvastatin 40 milliGRAM(s) Oral at bedtime  heparin  Injectable 5000 Unit(s) SubCutaneous every 8 hours  hydrochlorothiazide 12.5 milliGRAM(s) Oral daily  insulin lispro (HumaLOG) corrective regimen sliding scale   SubCutaneous three times a day before meals  lisinopril 10 milliGRAM(s) Oral two times a day  potassium chloride    Tablet ER 40 milliEquivalent(s) Oral once  potassium chloride    Tablet ER 20 milliEquivalent(s) Oral daily      MEDICATIONS  (PRN):  acetaminophen   Tablet .. 650 milliGRAM(s) Oral every 6 hours PRN Mild Pain (1 - 3)      Physical Exam      Neuro :  right facial droop  Respiratory: CTA B/L  CV: RRR, S1S2, no murmurs,   Abdominal: Soft, NT, ND +BS,  Extremities: No edema, + peripheral pulses    ASSESSMENT    Other specified visual disturbances  r/o cva  new dx dm2  h/o Hypertension  Myocardial infarction  Bell's palsy  History of hypertension  Coronary artery disease  Bell palsy  H/O heart artery stent      PLAN      cont tele,   aspirin, statin,   neuro cons noted  acute onset of double vision due to acute left internuclear ophthalmoplegia and this is most likely due to microvascular ischemic disease of the brainstem.    Treatment involves controlling diabetes and hypertension and aspirin atorvastatin.   Preferable antihypertensive would be angiotensin-converting enzyme inhibitor  with hydrochlorothiazide.   symptoms appear to be improving spontaneously and no further treatment is recommended for the symptoms.   pt may use an alternating patch over the eye if the addition troubles her driving.  cardio f/u noted  ce q8 x  1 neg noted above  echo with lvef >50% noted above  dec hctz to 12.5mg qd and inc lisinopril 10mg bid.  supplement potassium for hypokalemia  start humalog ss  endo cons  cont current meds  d/c plan pending endo cons

## 2019-02-07 NOTE — DISCHARGE NOTE ADULT - FUNCTIONAL SCREEN CURRENT LEVEL: AMBULATION, MLM
0 = independent Consent (Temporal Branch)/Introductory Paragraph: The rationale for Mohs was explained to the patient and consent was obtained. The risks, benefits and alternatives to therapy were discussed in detail. Specifically, the risks of damage to the temporal branch of the facial nerve, infection, scarring, bleeding, prolonged wound healing, incomplete removal, allergy to anesthesia, and recurrence were addressed. Prior to the procedure, the treatment site was clearly identified and confirmed by the patient. All components of Universal Protocol/PAUSE Rule completed.

## 2019-02-07 NOTE — PROGRESS NOTE ADULT - PROBLEM SELECTOR PLAN 2
History of Bell's palsy in 2017  - Patient believes she may have some residual deficits, unclear if current facial droop is due to Bell's palsy vs new stroke  - Neuro Dr Reis History of Bell's palsy in 2017  - Patient believes she may have some residual deficits, unclear if current facial droop is due to Bell's palsy vs new stroke  -pt may use an alternating patch over the eye if the addition troubles her driving.  - eye drop  - Neuro Dr Reis

## 2019-02-07 NOTE — CONSULT NOTE ADULT - PROBLEM SELECTOR RECOMMENDATION 9
new onset un cont   start lantus 10 units   dm teaching   nutrition eval  change to metformin 500 mg and amaryl 4mg qd upon d/c  fsg ac and hs

## 2019-02-07 NOTE — DIETITIAN INITIAL EVALUATION ADULT. - NS AS NUTRI INTERV MEALS SNACK
diet changed per recommendation. continue diet Rx as ordered with Consistent CHO DASH diet diet/Carbohydrate - modified diet/Composition of meals/snacks

## 2019-02-07 NOTE — DISCHARGE NOTE ADULT - HOSPITAL COURSE
54 y/o female, from home lives with daughter ambulates independently, with PMH of R sided Bell's Palsy in 2017 with some residual deficit, MI in 2004 s/p stent, HLD, HTN, noncompliant with medications and no PCP, who p/w change in right sided vision for 3 days. She says that on Sunday she noticed she was unable to focus her vision due to inability to focus her right eye. She reports that on Saturday she had a headache for which she took tylenol and it went away, but then she noticed the vision change the following day. She reports that the inability to focus her right eye makes her feel off balance. She says that she has not had any problems with her left eye. She does not notice a change in her appearance of her face. She denies any associated pain, loss of sensation of the face or elsewhere on the body, numbness, weakness, tingling, LOC, falls, dizziness, headaches aside from Saturday, nausea, vomiting, diarrhea, chest pain, SOB, fevers, chills, trauma, recent illness. She reports that she used to smoke 1 pack of cigarettes per week for 10 years until 2004. She reports that she drinks alcohol in moderation socially and her last drink was 3 weeks ago. patient admitted to telemetry. Her symptoms resolved. Her CT head negative, started on aspirin, statin. Has normal cardiac enzymes, carotid doppler shows irregular arterial pulse, echo normal She is advised she may use an alternating patch over the eye if the addition troubles her driving and eye drop. Neuro Dr Reis was consulted.  She has History of MI in 2004, s/p stents. started on aspirin, statin, no b blocker due to bradycardia.and has normal echoShe had History of HTN, not on meds, started on HCTZ 12.5mg qd and lisinopril 10mg bid. Cardio Dr Carter was consulted.  She has A1C 13.1, was started on hss and nutritionist was consulted, started on DASH diet. Endo Dr. Rojas consullted.  She had TSH elevated with normal T3.T4. Repeat TSH was   Patient is stable for discharge per attending and is advised to follow up with PCP as outpatient  Please refer to patient's complete medical chart with documents for a full hospital course, for this is only a brief summary. 54 y/o female, from home lives with daughter ambulates independently, with PMH of R sided Bell's Palsy in 2017 with some residual deficit, MI in 2004 s/p stent, HLD, HTN, noncompliant with medications and no PCP, who p/w change in right sided vision for 3 days. She says that on Sunday she noticed she was unable to focus her vision due to inability to focus her right eye. She reports that on Saturday she had a headache for which she took tylenol and it went away, but then she noticed the vision change the following day. She reports that the inability to focus her right eye makes her feel off balance. She says that she has not had any problems with her left eye. She does not notice a change in her appearance of her face. She denies any associated pain, loss of sensation of the face or elsewhere on the body, numbness, weakness, tingling, LOC, falls, dizziness, headaches aside from Saturday, nausea, vomiting, diarrhea, chest pain, SOB, fevers, chills, trauma, recent illness. She reports that she used to smoke 1 pack of cigarettes per week for 10 years until 2004. She reports that she drinks alcohol in moderation socially and her last drink was 3 weeks ago. patient admitted to telemetry. Her symptoms resolved. Her CT head negative, started on aspirin, statin. Has normal cardiac enzymes, carotid doppler shows irregular arterial pulse, echo normal She is advised she may use an alternating patch over the eye if the addition troubles her driving and eye drop. Neuro Dr Reis was consulted.  She has History of MI in 2004, s/p stents. started on aspirin, statin, no b blocker due to bradycardia.and has normal echoShe had History of HTN, not on meds, started on HCTZ 12.5mg qd and lisinopril 10mg bid. Cardio Dr Carter was consulted.  She has A1C 13.1, was started on hss and nutritionist was consulted, started on DASH diet. Endo Dr. Rojas consullted and is being discharged on metformin and amaryl.  She had TSH elevated with normal T3.T4. Repeat TSH was high so started on levothyroxine.  Patient is stable for discharge per attending and is advised to follow up with PCP as outpatient  Please refer to patient's complete medical chart with documents for a full hospital course, for this is only a brief summary.

## 2019-02-07 NOTE — PROGRESS NOTE ADULT - PROBLEM SELECTOR PLAN 4
History of HTN, not on meds, does not have PCP  - BP elevated on presentation  - started on lisinopril and HCTZ  - Cardio Dr Carter History of HTN, not on meds, does not have PCP  - BP elevated on presentation  - started on HCTZ 12.5mg qd and lisinopril 10mg bid.  - Cardio Dr Carter

## 2019-02-07 NOTE — PROGRESS NOTE ADULT - PROBLEM SELECTOR PLAN 1
R sided vision change, inability to focus, x3 days  - May be secondary to brainstem stroke  - Right sided facial droop, may be due to residual deficits from Bell's palsy in 2017  - Admit to tele for concern of stroke  - CT head negative  - Start on aspirin, statin  - normal cardiac enzymes  - carotid doppler shows irregular arterial pulse  - echo shows   - Neuro Dr Reis  - Cardio Dr Carter p/w R sided vision change, inability to focus, x3 days, resolved  - Right sided facial droop, due to residual deficits from Bell's palsy in 2017  - Admit to tele for concern of stroke  - CT head negative  - on aspirin, statin  - normal cardiac enzymes  - carotid doppler shows irregular arterial pulse  - echo normal   - Neuro Dr Reis  - Cardio Dr Carter

## 2019-02-07 NOTE — DISCHARGE NOTE ADULT - PLAN OF CARE
Resolution of symptoms and prevent further stroke Resolution of symptoms prevent complications Your HbAIC was 13.1 which indicates diabetes. Endocrinologist and nutritionist was consulted. You are started on metformin 500 mg and glimepiride 4mg qd. Continue with your blood sugar medication. You must maintain a healthy diet that consist of low sugar, low fat, low sodium diet. Exercise frequently if possible. Consider repeating your Hemoglobin A1c within 3 months after discharge to monitor your average blood glucose control. Follow up with primary care physician in one week after discharge. Continue with your blood pressure medication and statin as instructed. You are not on any beta-blocker medication as your heart rate is low. Maintain a healthy diet and exercise frequently if possible. Lose weight. Follow up with your primary care physician in one week after discharge. <130/80 Your blood pressure was very high and you are started on hydrochlorothiazide 12.5mg qd and  lisinopril 10mg bid. Continue with blood pressure medication. Maintain a healthy diet that consist of low sugar, low fat, low sodium diet. Exercise frequently if possible.  Follow up with primary care physician in one week after discharge. Your TSH level was high to 21.10. Endocrinologist was consulted and advised to start levothyroxine 50mcg. Please take your meds regularly. Get constant follow up with TSH levels with you primary care physician for medication adjustment if needed. Continue with cholesterol medications. Maintain a healthy diet that consist of low sugar, low fat, low sodium diet. Exercise frequently if possible.  Follow up with primary care physician in one week after discharge. You presented with blurring of vision. Your CT head negative, cardiac enzymes, carotid doppler, echo normal. Neurologist was consulted and advised that your symptoms are due to uncontrolled hypertension and diabetes mellitus causing microvascular ischemic disease of the brainstem. Treatment involves controlling diabetes and hypertension and aspirin atorvastatin. Please take your medications regularly. Follow up with your primary care physician and neurologist in one week after discharge. You have residual facial deficit due to bell's palsy. Please use an alternating patch over the eye if it troubles your driving. Follow up with your primary care physician in one week after discharge.

## 2019-02-07 NOTE — DISCHARGE NOTE ADULT - NS AS DC STROKE ED MATERIALS
Call 911 for Stroke/Stroke Warning Signs and Symptoms/Risk Factors for Stroke/Stroke Education Booklet/Prescribed Medications/Need for Followup After Discharge

## 2019-02-07 NOTE — DISCHARGE NOTE ADULT - CARE PLAN
Principal Discharge DX:	Stroke  Goal:	Resolution of symptoms and prevent further stroke  Secondary Diagnosis:	Bell's palsy  Goal:	Resolution of symptoms  Secondary Diagnosis:	Diabetes  Goal:	prevent complications  Assessment and plan of treatment:	Your HbAIC was 13.1 which indicates diabetes. Endocrinologist and nutritionist was consulted. You are started on metformin 500 mg and glimepiride 4mg qd. Continue with your blood sugar medication. You must maintain a healthy diet that consist of low sugar, low fat, low sodium diet. Exercise frequently if possible. Consider repeating your Hemoglobin A1c within 3 months after discharge to monitor your average blood glucose control. Follow up with primary care physician in one week after discharge.  Secondary Diagnosis:	Coronary artery disease  Goal:	prevent complications  Assessment and plan of treatment:	Continue with your blood pressure medication and statin as instructed. You are not on any beta-blocker medication as your heart rate is low. Maintain a healthy diet and exercise frequently if possible. Lose weight. Follow up with your primary care physician in one week after discharge.  Secondary Diagnosis:	Hypertension  Goal:	<130/80  Assessment and plan of treatment:	Your blood pressure was very high and you are started on hydrochlorothiazide 12.5mg qd and  lisinopril 10mg bid. Continue with blood pressure medication. Maintain a healthy diet that consist of low sugar, low fat, low sodium diet. Exercise frequently if possible.  Follow up with primary care physician in one week after discharge.  Secondary Diagnosis:	Hypothyroidism  Goal:	prevent complications  Assessment and plan of treatment:	Your TSH level was high to 21.10. Endocrinologist was consulted and advised to start levothyroxine 50mcg. Please take your meds regularly. Get constant follow up with TSH levels with you primary care physician for medication adjustment if needed.  Secondary Diagnosis:	Hyperlipidemia  Goal:	prevent complications  Assessment and plan of treatment:	Continue with cholesterol medications. Maintain a healthy diet that consist of low sugar, low fat, low sodium diet. Exercise frequently if possible.  Follow up with primary care physician in one week after discharge. Principal Discharge DX:	Stroke  Goal:	Resolution of symptoms and prevent further stroke  Assessment and plan of treatment:	You presented with blurring of vision. Your CT head negative, cardiac enzymes, carotid doppler, echo normal. Neurologist was consulted and advised that your symptoms are due to uncontrolled hypertension and diabetes mellitus causing microvascular ischemic disease of the brainstem. Treatment involves controlling diabetes and hypertension and aspirin atorvastatin. Please take your medications regularly. Follow up with your primary care physician and neurologist in one week after discharge.  Secondary Diagnosis:	Bell's palsy  Goal:	Resolution of symptoms  Assessment and plan of treatment:	You have residual facial deficit due to bell's palsy. Please use an alternating patch over the eye if it troubles your driving. Follow up with your primary care physician in one week after discharge.  Secondary Diagnosis:	Diabetes  Goal:	prevent complications  Assessment and plan of treatment:	Your HbAIC was 13.1 which indicates diabetes. Endocrinologist and nutritionist was consulted. You are started on metformin 500 mg and glimepiride 4mg qd. Continue with your blood sugar medication. You must maintain a healthy diet that consist of low sugar, low fat, low sodium diet. Exercise frequently if possible. Consider repeating your Hemoglobin A1c within 3 months after discharge to monitor your average blood glucose control. Follow up with primary care physician in one week after discharge.  Secondary Diagnosis:	Coronary artery disease  Goal:	prevent complications  Assessment and plan of treatment:	Continue with your blood pressure medication and statin as instructed. You are not on any beta-blocker medication as your heart rate is low. Maintain a healthy diet and exercise frequently if possible. Lose weight. Follow up with your primary care physician in one week after discharge.  Secondary Diagnosis:	Hypertension  Goal:	<130/80  Assessment and plan of treatment:	Your blood pressure was very high and you are started on hydrochlorothiazide 12.5mg qd and  lisinopril 10mg bid. Continue with blood pressure medication. Maintain a healthy diet that consist of low sugar, low fat, low sodium diet. Exercise frequently if possible.  Follow up with primary care physician in one week after discharge.  Secondary Diagnosis:	Hypothyroidism  Goal:	prevent complications  Assessment and plan of treatment:	Your TSH level was high to 21.10. Endocrinologist was consulted and advised to start levothyroxine 50mcg. Please take your meds regularly. Get constant follow up with TSH levels with you primary care physician for medication adjustment if needed.  Secondary Diagnosis:	Hyperlipidemia  Goal:	prevent complications  Assessment and plan of treatment:	Continue with cholesterol medications. Maintain a healthy diet that consist of low sugar, low fat, low sodium diet. Exercise frequently if possible.  Follow up with primary care physician in one week after discharge.

## 2019-02-07 NOTE — CONSULT NOTE ADULT - SUBJECTIVE AND OBJECTIVE BOX
Patient is a 53y old  Female who presents with a chief complaint of change in vision of R eye x3 days (07 Feb 2019 14:48)      HPI:  This is a 54 y/o female, from home lives with daughter ambulates independently, with PMH of R sided Bell's Palsy in 2017 with some residual deficit, MI in 2004 s/p stent, HLD, HTN, noncompliant with medications and no PCP, who p/w change in right sided vision for 3 days. She says that on Sunday she noticed she was unable to focus her vision due to inability to focus her right eye. She reports that on Saturday she had a headache for which she took tylenol and it went away, but then she noticed the vision change the following day. She reports that the inability to focus her right eye makes her feel off balance. She says that she has not had any problems with her left eye. She does not notice a change in her appearance of her face. She denies any associated pain, loss of sensation of the face or elsewhere on the body, numbness, weakness, tingling, LOC, falls, dizziness, headaches aside from Saturday, nausea, vomiting, diarrhea, chest pain, SOB, fevers, chills, trauma, recent illness. She reports that she used to smoke 1 pack of cigarettes per week for 10 years until 2004. She reports that she drinks alcohol in moderation socially and her last drink was 3 weeks ago. (05 Feb 2019 19:33)      PAST MEDICAL & SURGICAL HISTORY:  Hypertension  Myocardial infarction  Bell's palsy  Coronary artery disease: 4 cardiac stents  H/O heart artery stent         MEDICATIONS  (STANDING):  artificial  tears Solution 1 Drop(s) Both EYES daily  aspirin  chewable 81 milliGRAM(s) Oral daily  atorvastatin 40 milliGRAM(s) Oral at bedtime  heparin  Injectable 5000 Unit(s) SubCutaneous every 8 hours  hydrochlorothiazide 12.5 milliGRAM(s) Oral daily  insulin lispro (HumaLOG) corrective regimen sliding scale   SubCutaneous three times a day before meals  lisinopril 10 milliGRAM(s) Oral two times a day  potassium chloride    Tablet ER 40 milliEquivalent(s) Oral once  potassium chloride    Tablet ER 20 milliEquivalent(s) Oral daily    MEDICATIONS  (PRN):  acetaminophen   Tablet .. 650 milliGRAM(s) Oral every 6 hours PRN Mild Pain (1 - 3)      FAMILY HISTORY:  No pertinent family history      SOCIAL HISTORY:      REVIEW OF SYSTEMS:  CONSTITUTIONAL: No fever, weight loss, or fatigue  EYES: No eye pain, visual disturbances, or discharge  ENT:  No difficulty hearing, tinnitus, vertigo; No sinus or throat pain  NECK: No pain or stiffness  RESPIRATORY: No cough, wheezing, chills or hemoptysis; No Shortness of Breath  CARDIOVASCULAR: No chest pain, palpitations, passing out, dizziness, or leg swelling  GASTROINTESTINAL: No abdominal or epigastric pain. No nausea, vomiting, or hematemesis; No diarrhea or constipation. No melena or hematochezia.  GENITOURINARY: No dysuria, frequency, hematuria, or incontinence  NEUROLOGICAL: No headaches, memory loss, loss of strength, numbness, or tremors  SKIN: No itching, burning, rashes, or lesions   LYMPH Nodes: No enlarged glands  ENDOCRINE: No heat or cold intolerance; No hair loss  MUSCULOSKELETAL: No joint pain or swelling; No muscle, back, or extremity pain  PSYCHIATRIC: No depression, anxiety, mood swings, or difficulty sleeping  HEME/LYMPH: No easy bruising, or bleeding gums  ALLERGY AND IMMUNOLOGIC: No hives or eczema	        Vital Signs Last 24 Hrs  T(C): 36.4 (07 Feb 2019 11:32), Max: 36.9 (06 Feb 2019 19:27)  T(F): 97.6 (07 Feb 2019 11:32), Max: 98.5 (06 Feb 2019 19:27)  HR: 55 (07 Feb 2019 11:32) (51 - 61)  BP: 137/70 (07 Feb 2019 11:32) (120/69 - 169/81)  BP(mean): --  RR: 18 (07 Feb 2019 11:32) (18 - 18)  SpO2: 97% (07 Feb 2019 11:32) (97% - 100%)      Constitutional:    NC/AT:    HEENT:    Neck:  No JVD, bruits or thyromegaly    Respiratory:  Clear without rales or rhonchi    Cardiovascular:  RR without murmur, rub or gallop.    Gastrointestinal: Soft without hepatosplenomegaly.    Extremities: without cyanosis, clubbing or edema.    Neurological:  Oriented   x      . No gross sensory or motor defects.        LABS:                        15.2   11.5  )-----------( 257      ( 07 Feb 2019 06:52 )             46.6     02-07    134<L>  |  101  |  10  ----------------------------<  249<H>  3.3<L>   |  25  |  0.70    Ca    8.6      07 Feb 2019 06:52  Phos  2.6     02-06  Mg     2.0     02-06      CARDIAC MARKERS ( 06 Feb 2019 08:26 )  0.028 ng/mL / x     / 26 U/L / x     / <1.0 ng/mL  CARDIAC MARKERS ( 05 Feb 2019 21:45 )  0.024 ng/mL / x     / 25 U/L / x     / <1.0 ng/mL          CAPILLARY BLOOD GLUCOSE      POCT Blood Glucose.: 240 mg/dL (07 Feb 2019 11:49)  POCT Blood Glucose.: 211 mg/dL (07 Feb 2019 07:51)      RADIOLOGY & ADDITIONAL STUDIES: Patient is a 53y old  Female who presents with a chief complaint of change in vision of R eye x3 days (07 Feb 2019 14:48)      HPI:  This is a 52 y/o female, from home lives with daughter ambulates independently, with PMH of R sided Bell's Palsy in 2017 with some residual deficit, MI in 2004 s/p stent, HLD, HTN, noncompliant with medications and no PCP, who p/w change in right sided vision for 3 days. She says that on Sunday she noticed she was unable to focus her vision due to inability to focus her right eye. She reports that on Saturday she had a headache for which she took tylenol and it went away, but then she noticed the vision change the following day. She reports that the inability to focus her right eye makes her feel off balance. She says that she has not had any problems with her left eye. She does not notice a change in her appearance of her face. She denies any associated pain, loss of sensation of the face or elsewhere on the body, numbness, weakness, tingling, LOC, falls, dizziness, headaches aside from Saturday, nausea, vomiting, diarrhea, chest pain, SOB, fevers, chills, trauma, recent illness. She reports that she used to smoke 1 pack of cigarettes per week for 10 years until 2004. She reports that she drinks alcohol in moderation socially and her last drink was 3 weeks ago. (05 Feb 2019 19:33)      PAST MEDICAL & SURGICAL HISTORY:  Hypertension  Myocardial infarction  Bell's palsy  Coronary artery disease: 4 cardiac stents  H/O heart artery stent         MEDICATIONS  (STANDING):  artificial  tears Solution 1 Drop(s) Both EYES daily  aspirin  chewable 81 milliGRAM(s) Oral daily  atorvastatin 40 milliGRAM(s) Oral at bedtime  heparin  Injectable 5000 Unit(s) SubCutaneous every 8 hours  hydrochlorothiazide 12.5 milliGRAM(s) Oral daily  insulin lispro (HumaLOG) corrective regimen sliding scale   SubCutaneous three times a day before meals  lisinopril 10 milliGRAM(s) Oral two times a day  potassium chloride    Tablet ER 40 milliEquivalent(s) Oral once  potassium chloride    Tablet ER 20 milliEquivalent(s) Oral daily    MEDICATIONS  (PRN):  acetaminophen   Tablet .. 650 milliGRAM(s) Oral every 6 hours PRN Mild Pain (1 - 3)      FAMILY HISTORY:  No pertinent family history      SOCIAL HISTORY:      REVIEW OF SYSTEMS:  CONSTITUTIONAL: No fever, weight loss, or fatigue  EYES: No eye pain, visual disturbances, or discharge  ENT:  No difficulty hearing, tinnitus, vertigo; No sinus or throat pain  NECK: No pain or stiffness  RESPIRATORY: No cough, wheezing, chills or hemoptysis; No Shortness of Breath  CARDIOVASCULAR: No chest pain, palpitations, passing out, dizziness, or leg swelling  GASTROINTESTINAL: No abdominal or epigastric pain. No nausea, vomiting, or hematemesis; No diarrhea or constipation. No melena or hematochezia.  GENITOURINARY: No dysuria, frequency, hematuria, or incontinence  NEUROLOGICAL: No headaches, memory loss, loss of strength, numbness, or tremors  SKIN: No itching, burning, rashes, or lesions   LYMPH Nodes: No enlarged glands  ENDOCRINE: No heat or cold intolerance; No hair loss  MUSCULOSKELETAL: No joint pain or swelling; No muscle, back, or extremity pain  PSYCHIATRIC: No depression, anxiety, mood swings, or difficulty sleeping  HEME/LYMPH: No easy bruising, or bleeding gums  ALLERGY AND IMMUNOLOGIC: No hives or eczema	        Vital Signs Last 24 Hrs  T(C): 36.4 (07 Feb 2019 11:32), Max: 36.9 (06 Feb 2019 19:27)  T(F): 97.6 (07 Feb 2019 11:32), Max: 98.5 (06 Feb 2019 19:27)  HR: 55 (07 Feb 2019 11:32) (51 - 61)  BP: 137/70 (07 Feb 2019 11:32) (120/69 - 169/81)  BP(mean): --  RR: 18 (07 Feb 2019 11:32) (18 - 18)  SpO2: 97% (07 Feb 2019 11:32) (97% - 100%)      Constitutional    HEENT: nad    Neck:  No JVD, bruits or thyromegaly    Respiratory:  Clear without rales or rhonchi    Cardiovascular:  RR without murmur, rub or gallop.    Gastrointestinal: Soft without hepatosplenomegaly.    Extremities: without cyanosis, clubbing or edema.    Neurological:  Oriented   x  3    . No gross sensory or motor defects.        LABS:                        15.2   11.5  )-----------( 257      ( 07 Feb 2019 06:52 )             46.6     02-07    134<L>  |  101  |  10  ----------------------------<  249<H>  3.3<L>   |  25  |  0.70    Ca    8.6      07 Feb 2019 06:52  Phos  2.6     02-06  Mg     2.0     02-06      CARDIAC MARKERS ( 06 Feb 2019 08:26 )  0.028 ng/mL / x     / 26 U/L / x     / <1.0 ng/mL  CARDIAC MARKERS ( 05 Feb 2019 21:45 )  0.024 ng/mL / x     / 25 U/L / x     / <1.0 ng/mL          CAPILLARY BLOOD GLUCOSE      POCT Blood Glucose.: 240 mg/dL (07 Feb 2019 11:49)  POCT Blood Glucose.: 211 mg/dL (07 Feb 2019 07:51)      RADIOLOGY & ADDITIONAL STUDIES:

## 2019-02-07 NOTE — DIETITIAN INITIAL EVALUATION ADULT. - PERTINENT MEDS FT
reviewed  MEDICATIONS  (STANDING):  artificial  tears Solution 1 Drop(s) Both EYES daily  aspirin  chewable 81 milliGRAM(s) Oral daily  atorvastatin 40 milliGRAM(s) Oral at bedtime  heparin  Injectable 5000 Unit(s) SubCutaneous every 8 hours  hydrochlorothiazide 12.5 milliGRAM(s) Oral daily  insulin lispro (HumaLOG) corrective regimen sliding scale   SubCutaneous three times a day before meals  lisinopril 10 milliGRAM(s) Oral two times a day  potassium chloride    Tablet ER 40 milliEquivalent(s) Oral once  potassium chloride    Tablet ER 20 milliEquivalent(s) Oral daily    MEDICATIONS  (PRN):  acetaminophen   Tablet .. 650 milliGRAM(s) Oral every 6 hours PRN Mild Pain (1 - 3)

## 2019-02-07 NOTE — DISCHARGE NOTE ADULT - PATIENT PORTAL LINK FT
You can access the [facility], offered by [facility], by registering with the following website: [email or web address]

## 2019-02-07 NOTE — PROGRESS NOTE ADULT - PROBLEM SELECTOR PLAN 7
IMPROVE VTE Individual Risk Assessment    RISK                                                          Points  [] Previous VTE                                          3  [] Thrombophilia                                         2  [] Lower limb paralysis                                2   [] Current Cancer                                        2   [x] Immobilization > 24 hrs                          1  [] ICU/CCU stay > 24 hours                          1  [] Age > 60                                                1    Improve Score 1.  HSQ for DVT ppx.
IMPROVE VTE Individual Risk Assessment    RISK                                                          Points  [] Previous VTE                                          3  [] Thrombophilia                                         2  [] Lower limb paralysis                                2   [] Current Cancer                                        2   [x] Immobilization > 24 hrs                          1  [] ICU/CCU stay > 24 hours                          1  [] Age > 60                                                1    Improve Score 1.  HSQ for DVT ppx.

## 2019-02-07 NOTE — PROGRESS NOTE ADULT - PROBLEM SELECTOR PLAN 5
A1C 13.1  not on any meds  on hss  nutrition consult  on DASH diet A1C 13.1  not on any meds  on hss  nutrition consult  on DASH diet  Endo Dr. Rojas consullted

## 2019-02-07 NOTE — CONSULT NOTE ADULT - ASSESSMENT
This is a 52 y/o female, from home lives with daughter ambulates independently, with PMH of R sided Bell's Palsy in 2017 with some residual deficit, MI in 2004 s/p stent, HLD, HTN, noncompliant with medications and no PCP, who p/w change in right sided vision for 3 days,uncontrolled dm and HTN.  1.D/C tele.  2.Uncontrolled DM-endo eval, Insulin.  3.Echocardiogram.  4.Carotid doppler.  5.Neurology eval noted.  6.CAD-asa,statin, no b blocker due to bradycardia.  7.HTN-dec hctz 12.5mg qd and inc lisinopril 10mg bid.  8.GI and DVT prophylaxis.
acute onset of double vision due to acute left internuclear ophthalmoplegia.. In the setting of uncontrolled hypertension and diabetes mellitus, and this is most likely due to microvascular ischemic disease of the brainstem.  Treatment involves controlling diabetes and hypertension and aspirin atorvastatin. Preferable antihypertensive would be angiotensin-converting enzyme inhibitor  with hydrochlorothiazide. Her symptoms appear to be improving spontaneously and no further treatment is recommended for the symptoms. She may use an alternating patch over the eye if the addition troubles her driving.
54 y/o female, from home lives with daughter ambulates independently, with PMH of R sided Bell's Palsy in 2017 with some residual deficit, MI in 2004 s/p stent, HLD, HTN, noncompliant with medications and no PCP, who p/w change in right sided vision for 3 days. Found to have un cont DM.

## 2019-02-07 NOTE — PROGRESS NOTE ADULT - SUBJECTIVE AND OBJECTIVE BOX
PGY 1 Note discussed with supervising resident and primary attending    Patient is a 53y old  Female who presents with a chief complaint of change in vision of R eye x3 days (07 Feb 2019 12:40)      INTERVAL HPI/OVERNIGHT EVENTS: no acute overnight events, symptoms resolved    MEDICATIONS  (STANDING):  artificial  tears Solution 1 Drop(s) Both EYES daily  aspirin  chewable 81 milliGRAM(s) Oral daily  atorvastatin 40 milliGRAM(s) Oral at bedtime  heparin  Injectable 5000 Unit(s) SubCutaneous every 8 hours  hydrochlorothiazide 12.5 milliGRAM(s) Oral daily  insulin lispro (HumaLOG) corrective regimen sliding scale   SubCutaneous three times a day before meals  lisinopril 10 milliGRAM(s) Oral two times a day  potassium chloride    Tablet ER 40 milliEquivalent(s) Oral once  potassium chloride    Tablet ER 20 milliEquivalent(s) Oral daily    MEDICATIONS  (PRN):  acetaminophen   Tablet .. 650 milliGRAM(s) Oral every 6 hours PRN Mild Pain (1 - 3)      __________________________________________________  REVIEW OF SYSTEMS:    CONSTITUTIONAL: No fever,   EYES: no acute visual disturbances  NECK: No pain or stiffness  RESPIRATORY: No cough; No shortness of breath  CARDIOVASCULAR: No chest pain, no palpitations  GASTROINTESTINAL: No pain. No nausea or vomiting; No diarrhea   NEUROLOGICAL: No headache or numbness, no tremors  MUSCULOSKELETAL: No joint pain, no muscle pain  GENITOURINARY: no dysuria, no frequency, no hesitancy  PSYCHIATRY: no depression , no anxiety  ALL OTHER  ROS negative        Vital Signs Last 24 Hrs  T(C): 36.4 (07 Feb 2019 11:32), Max: 36.9 (06 Feb 2019 19:27)  T(F): 97.6 (07 Feb 2019 11:32), Max: 98.5 (06 Feb 2019 19:27)  HR: 55 (07 Feb 2019 11:32) (51 - 83)  BP: 137/70 (07 Feb 2019 11:32) (120/69 - 187/88)  BP(mean): --  RR: 18 (07 Feb 2019 11:32) (17 - 18)  SpO2: 97% (07 Feb 2019 11:32) (97% - 100%)    ________________________________________________  PHYSICAL EXAM:  GENERAL: NAD  HEENT: Normocephalic;  conjunctivae and sclerae clear; moist mucous membranes;   NECK : supple  CHEST/LUNG: Clear to auscultation bilaterally with good air entry   HEART: S1 S2  regular; no murmurs, gallops or rubs  ABDOMEN: Soft, Nontender, Nondistended; Bowel sounds present  EXTREMITIES: no cyanosis; no edema; no calf tenderness  SKIN: warm and dry; no rash  NERVOUS SYSTEM:  Awake and alert; Oriented  to place, person and time ; no new deficits, Rt. sided facial palsy    _________________________________________________  LABS:                        15.2   11.5  )-----------( 257      ( 07 Feb 2019 06:52 )             46.6     02-07    134<L>  |  101  |  10  ----------------------------<  249<H>  3.3<L>   |  25  |  0.70    Ca    8.6      07 Feb 2019 06:52  Phos  2.6     02-06  Mg     2.0     02-06    TPro  8.2  /  Alb  3.6  /  TBili  0.4  /  DBili  x   /  AST  31  /  ALT  37  /  AlkPhos  95  02-05    PT/INR - ( 05 Feb 2019 15:19 )   PT: 11.6 sec;   INR: 1.04 ratio         PTT - ( 05 Feb 2019 15:19 )  PTT:29.7 sec    CAPILLARY BLOOD GLUCOSE      POCT Blood Glucose.: 240 mg/dL (07 Feb 2019 11:49)  POCT Blood Glucose.: 211 mg/dL (07 Feb 2019 07:51)        RADIOLOGY & ADDITIONAL TESTS:    < from: Transthoracic Echocardiogram (02.06.19 @ 07:38) >    PROCEDURE: Transthoracic echocardiogram with 2-D, M-Mode  and complete spectral and color flow Doppler.  INDICATION: Abnormal electrocardiogram [ECG] [EKG] (R94.31)  HISTORY:  ------------------------------------------------------------------------  DIMENSIONS:  Dimensions:     Normal Values:  LA:     4.1 cm    2.0 - 4.0 cm  Ao:     2.9 cm    2.0 - 3.8 cm  SEPTUM: 0.9 cm    0.6 - 1.2 cm  PWT:    0.8 cm    0.6 - 1.1 cm  LVIDd:  4.9 cm    3.0 - 5.6 cm  LVIDs:  3.5 cm    1.8 - 4.0 cm      Derived Variables:  LVMI: 82 g/m2  RWT: 0.32  Ejection Fraction Visual Estimate: >55 %    ------------------------------------------------------------------------  OBSERVATIONS:  Mitral Valve: Normal mitral valve. Mild mitral  regurgitation.  Aortic Root: Normal aortic root.  Aortic Valve: Normal trileaflet aortic valve. Mild aortic  regurgitation.  Left Atrium: Normal left atrium.  LA volume index = 27  cc/m2.  Left Ventricle: Endocardium not well visualized; grossly  normal left ventricular systolic function baaed on limited  views.   Segmental wall motion could not be assessed.  Normal left ventricular internal dimensions and wall  thicknesses.  Right Heart: Normal right atrium. Right ventricle not well  visualized.  Probably normal right ventricular size and  systolic function. There is trace tricuspid regurgitation.  Normal pulmonic valve.  Pericardium/PleuraNormalpericardium with no pericardial  effusion.  Hemodynamic: Incomplete tricuspid regurgitation jet  precludes accurate assessment of pulmonary artery systolic  pressure.  ------------------------------------------------------------------------  CONCLUSIONS:  TECHNICALLY VERY DIFFICULT STUDY, POOR ACOUSTIC WINDOWS    1. Mild mitral regurgitation.  2.  Mild aortic regurgitation.  3. Normal left ventricular internal dimensions and wall  thicknesses.  4. Endocardium not well visualized; grossly normal left  ventricular systolic function baaed on limited views.  Segmental wall motion could not be assessed.  5. Right ventricle not well visualized.  Probably normal  right ventricular size and systolic function.          Imaging Personally Reviewed:   YES    Consultant(s) Notes Reviewed:   YES    Care Discussed with Consultants :  YES    Plan of care was discussed with patient and /or primary care giver; all questions and concerns were addressed and care was aligned with patient's wishes.

## 2019-02-07 NOTE — DIETITIAN INITIAL EVALUATION ADULT. - OTHER INFO
Nutrition consult requested for education; lives home PTA; skin intact; denied GI distress, chewing or swallowing problem at present, no specific food choices reported; no h/o DM, pt aware of new-onset DM, pending endoscopy; eager to know nutrition for DM management

## 2019-02-07 NOTE — PROGRESS NOTE ADULT - PROBLEM SELECTOR PLAN 3
History of MI in 2004, s/p stents  - Noncompliant with medications  - Start on aspirin, statin, hold off on plavix for now pending stroke w/u  - F/u echo  - Cardio Dr Carter History of MI in 2004, s/p stents  - Noncompliant with medications  - on aspirin, statin, no b blocker due to bradycardia.  - normal echo  - Cardio Dr Carter

## 2019-02-08 VITALS
DIASTOLIC BLOOD PRESSURE: 68 MMHG | OXYGEN SATURATION: 100 % | HEART RATE: 82 BPM | SYSTOLIC BLOOD PRESSURE: 145 MMHG | RESPIRATION RATE: 18 BRPM | TEMPERATURE: 98 F

## 2019-02-08 LAB
ANION GAP SERPL CALC-SCNC: 8 MMOL/L — SIGNIFICANT CHANGE UP (ref 5–17)
BUN SERPL-MCNC: 12 MG/DL — SIGNIFICANT CHANGE UP (ref 7–18)
CALCIUM SERPL-MCNC: 9.1 MG/DL — SIGNIFICANT CHANGE UP (ref 8.4–10.5)
CHLORIDE SERPL-SCNC: 99 MMOL/L — SIGNIFICANT CHANGE UP (ref 96–108)
CO2 SERPL-SCNC: 28 MMOL/L — SIGNIFICANT CHANGE UP (ref 22–31)
CREAT SERPL-MCNC: 0.83 MG/DL — SIGNIFICANT CHANGE UP (ref 0.5–1.3)
GLUCOSE BLDC GLUCOMTR-MCNC: 207 MG/DL — HIGH (ref 70–99)
GLUCOSE BLDC GLUCOMTR-MCNC: 218 MG/DL — HIGH (ref 70–99)
GLUCOSE BLDC GLUCOMTR-MCNC: 219 MG/DL — HIGH (ref 70–99)
GLUCOSE BLDC GLUCOMTR-MCNC: 230 MG/DL — HIGH (ref 70–99)
GLUCOSE BLDC GLUCOMTR-MCNC: 231 MG/DL — HIGH (ref 70–99)
GLUCOSE BLDC GLUCOMTR-MCNC: 261 MG/DL — HIGH (ref 70–99)
GLUCOSE BLDC GLUCOMTR-MCNC: 293 MG/DL — HIGH (ref 70–99)
GLUCOSE BLDC GLUCOMTR-MCNC: 300 MG/DL — HIGH (ref 70–99)
GLUCOSE SERPL-MCNC: 236 MG/DL — HIGH (ref 70–99)
POTASSIUM SERPL-MCNC: 4 MMOL/L — SIGNIFICANT CHANGE UP (ref 3.5–5.3)
POTASSIUM SERPL-SCNC: 4 MMOL/L — SIGNIFICANT CHANGE UP (ref 3.5–5.3)
SODIUM SERPL-SCNC: 135 MMOL/L — SIGNIFICANT CHANGE UP (ref 135–145)
TSH SERPL-MCNC: 21.1 UU/ML — HIGH (ref 0.34–4.82)

## 2019-02-08 RX ORDER — LEVOTHYROXINE SODIUM 125 MCG
1 TABLET ORAL
Qty: 14 | Refills: 0 | OUTPATIENT
Start: 2019-02-08 | End: 2019-02-21

## 2019-02-08 RX ORDER — METFORMIN HYDROCHLORIDE 850 MG/1
1 TABLET ORAL
Qty: 14 | Refills: 0 | OUTPATIENT
Start: 2019-02-08 | End: 2019-02-21

## 2019-02-08 RX ORDER — ASPIRIN/CALCIUM CARB/MAGNESIUM 324 MG
1 TABLET ORAL
Qty: 14 | Refills: 0 | OUTPATIENT
Start: 2019-02-08 | End: 2019-02-21

## 2019-02-08 RX ORDER — LEVOTHYROXINE SODIUM 125 MCG
50 TABLET ORAL DAILY
Qty: 0 | Refills: 0 | Status: DISCONTINUED | OUTPATIENT
Start: 2019-02-08 | End: 2019-02-08

## 2019-02-08 RX ORDER — GLIMEPIRIDE 1 MG
1 TABLET ORAL
Qty: 14 | Refills: 0 | OUTPATIENT
Start: 2019-02-08 | End: 2019-02-21

## 2019-02-08 RX ORDER — ATORVASTATIN CALCIUM 80 MG/1
1 TABLET, FILM COATED ORAL
Qty: 14 | Refills: 0 | OUTPATIENT
Start: 2019-02-08 | End: 2019-02-21

## 2019-02-08 RX ORDER — LISINOPRIL 2.5 MG/1
1 TABLET ORAL
Qty: 28 | Refills: 0 | OUTPATIENT
Start: 2019-02-08 | End: 2019-02-21

## 2019-02-08 RX ADMIN — Medication 20 MILLIEQUIVALENT(S): at 11:55

## 2019-02-08 RX ADMIN — Medication 1 DROP(S): at 11:55

## 2019-02-08 RX ADMIN — Medication 3: at 11:54

## 2019-02-08 RX ADMIN — HEPARIN SODIUM 5000 UNIT(S): 5000 INJECTION INTRAVENOUS; SUBCUTANEOUS at 14:44

## 2019-02-08 RX ADMIN — LISINOPRIL 10 MILLIGRAM(S): 2.5 TABLET ORAL at 17:41

## 2019-02-08 RX ADMIN — Medication 12.5 MILLIGRAM(S): at 05:44

## 2019-02-08 RX ADMIN — Medication 3: at 17:40

## 2019-02-08 RX ADMIN — Medication 81 MILLIGRAM(S): at 11:55

## 2019-02-08 RX ADMIN — Medication 2: at 08:23

## 2019-02-08 RX ADMIN — HEPARIN SODIUM 5000 UNIT(S): 5000 INJECTION INTRAVENOUS; SUBCUTANEOUS at 05:45

## 2019-02-08 RX ADMIN — LISINOPRIL 10 MILLIGRAM(S): 2.5 TABLET ORAL at 05:44

## 2019-02-08 NOTE — PROGRESS NOTE ADULT - PROVIDER SPECIALTY LIST ADULT
Cardiology
Endocrinology
Internal Medicine

## 2019-02-08 NOTE — PROGRESS NOTE ADULT - REASON FOR ADMISSION
change in vision of R eye x3 days

## 2019-02-08 NOTE — PROGRESS NOTE ADULT - ASSESSMENT
This is a 52 y/o female, from home lives with daughter ambulates independently, with PMH of R sided Bell's Palsy in 2017 with some residual deficit, MI in 2004 s/p stent, HLD, HTN, noncompliant with medications and no PCP, who p/w change in right sided vision for 3 days. She says that on Sunday she noticed she was unable to focus her vision due to inability to focus her right eye. She reports that on Saturday she had a headache for which she took tylenol and it went away, but then she noticed the vision change the following day. She reports that the inability to focus her right eye makes her feel off balance. She says that she has not had any problems with her left eye. She does not notice a change in her appearance of her face. She denies any associated pain, loss of sensation of the face or elsewhere on the body, numbness, weakness, tingling, LOC, falls, dizziness, headaches aside from Saturday, nausea, vomiting, diarrhea, chest pain, SOB, fevers, chills, trauma, recent illness. She reports that she used to smoke 1 pack of cigarettes per week for 10 years until 2004. She reports that she drinks alcohol in moderation socially and her last drink was 3 weeks ago.    Admitting patient to telemetry to r/o brainstem stroke.
This is a 52 y/o female, from home lives with daughter ambulates independently, with PMH of R sided Bell's Palsy in 2017 with some residual deficit, MI in 2004 s/p stent, HLD, HTN, noncompliant with medications and no PCP, who p/w change in right sided vision for 3 days. She says that on Sunday she noticed she was unable to focus her vision due to inability to focus her right eye. She reports that on Saturday she had a headache for which she took tylenol and it went away, but then she noticed the vision change the following day. She reports that the inability to focus her right eye makes her feel off balance. She says that she has not had any problems with her left eye. She does not notice a change in her appearance of her face. She denies any associated pain, loss of sensation of the face or elsewhere on the body, numbness, weakness, tingling, LOC, falls, dizziness, headaches aside from Saturday, nausea, vomiting, diarrhea, chest pain, SOB, fevers, chills, trauma, recent illness. She reports that she used to smoke 1 pack of cigarettes per week for 10 years until 2004. She reports that she drinks alcohol in moderation socially and her last drink was 3 weeks ago.    Admitting patient to telemetry to r/o brainstem stroke.
This is a 54 y/o female, from home lives with daughter ambulates independently, with PMH of R sided Bell's Palsy in 2017 with some residual deficit, MI in 2004 s/p stent, HLD, HTN, noncompliant with medications and no PCP, who p/w change in right sided vision for 3 days,uncontrolled dm and HTN.  1.Hypothyroidism new onset-d/w Endo start synthroid 50mcg qd.  2.Uncontrolled DM-endo eval noted..  3.CAD-asa,statin, no b blocker due to bradycardia.  4.HTN-hctz 12.5mg qd and  lisinopril 10mg bid.  5.GI and DVT prophylaxis.

## 2019-02-08 NOTE — PROGRESS NOTE ADULT - SUBJECTIVE AND OBJECTIVE BOX
Patient is a 53y old  Female who presents with a chief complaint of change in vision of R eye x3 days (08 Feb 2019 10:48)    pt seen in tele [ x ], reg med floor [   ], bed [ x ], chair at bedside [   ], a+o x3 [x  ], lethargic [  ],  nad [x  ]      Allergies    No Known Allergies        Vitals    T(F): 97.2 (02-08-19 @ 11:14), Max: 98.5 (02-07-19 @ 19:48)  HR: 51 (02-08-19 @ 11:14) (51 - 72)  BP: 148/70 (02-08-19 @ 11:14) (130/65 - 153/83)  RR: 18 (02-08-19 @ 11:14) (17 - 18)  SpO2: 98% (02-08-19 @ 11:14) (97% - 100%)  Wt(kg): --  CAPILLARY BLOOD GLUCOSE      POCT Blood Glucose.: 230 mg/dL (08 Feb 2019 07:51)      Labs                          15.2   11.5  )-----------( 257      ( 07 Feb 2019 06:52 )             46.6       02-08    135  |  99  |  12  ----------------------------<  236<H>  4.0   |  28  |  0.83    Ca    9.1      08 Feb 2019 07:17                  Radiology Results      Meds    MEDICATIONS  (STANDING):  artificial  tears Solution 1 Drop(s) Both EYES daily  aspirin  chewable 81 milliGRAM(s) Oral daily  atorvastatin 40 milliGRAM(s) Oral at bedtime  heparin  Injectable 5000 Unit(s) SubCutaneous every 8 hours  hydrochlorothiazide 12.5 milliGRAM(s) Oral daily  insulin glargine Injectable (LANTUS) 10 Unit(s) SubCutaneous at bedtime  insulin lispro (HumaLOG) corrective regimen sliding scale   SubCutaneous three times a day before meals  levothyroxine 50 MICROGram(s) Oral daily  lisinopril 10 milliGRAM(s) Oral two times a day  potassium chloride    Tablet ER 20 milliEquivalent(s) Oral daily  potassium chloride    Tablet ER 40 milliEquivalent(s) Oral once      MEDICATIONS  (PRN):  acetaminophen   Tablet .. 650 milliGRAM(s) Oral every 6 hours PRN Mild Pain (1 - 3)      Physical Exam      Neuro :  right facial droop  Respiratory: CTA B/L  CV: RRR, S1S2, no murmurs,   Abdominal: Soft, NT, ND +BS,  Extremities: No edema, + peripheral pulses    ASSESSMENT    Other specified visual disturbances  r/o cva  new dx dm2  h/o Hypertension  Myocardial infarction  Bell's palsy  History of hypertension  Coronary artery disease  Bell palsy  H/O heart artery stent      PLAN      cont tele,   aspirin, statin,   neuro cons noted  acute onset of double vision due to acute left internuclear ophthalmoplegia and this is most likely due to microvascular ischemic disease of the brainstem.    Treatment involves controlling diabetes and hypertension and aspirin atorvastatin.   Preferable antihypertensive would be angiotensin-converting enzyme inhibitor  with hydrochlorothiazide.   symptoms appear to be improving spontaneously and no further treatment is recommended for the symptoms.   pt may use an alternating patch over the eye if the addition troubles her driving.  cardio f/u noted  ce q8 x  1 neg noted above  echo with lvef >50% noted above  cont hctz to 12.5mg qd and lisinopril 10mg bid.  endo f/u noted  change to metformin 500 mg and amaryl 4mg qd upon d/c  cont current meds  pt stable for d/c

## 2019-02-08 NOTE — PROGRESS NOTE ADULT - SUBJECTIVE AND OBJECTIVE BOX
CHIEF COMPLAINT:Patient is a 53y old  Female who presents with a chief complaint of change in vision of R eye x3 days.Pt feeling better.    	  REVIEW OF SYSTEMS:  CONSTITUTIONAL: No fever, weight loss, or fatigue  EYES: No eye pain, visual disturbances, or discharge  ENT:  No difficulty hearing, tinnitus, vertigo; No sinus or throat pain  NECK: No pain or stiffness  RESPIRATORY: No cough, wheezing, chills or hemoptysis; No Shortness of Breath  CARDIOVASCULAR: No chest pain, palpitations, passing out, dizziness, or leg swelling  GASTROINTESTINAL: No abdominal or epigastric pain. No nausea, vomiting, or hematemesis; No diarrhea or constipation. No melena or hematochezia.  GENITOURINARY: No dysuria, frequency, hematuria, or incontinence  NEUROLOGICAL: No headaches, memory loss, loss of strength, numbness, or tremors  SKIN: No itching, burning, rashes, or lesions   LYMPH Nodes: No enlarged glands  ENDOCRINE: No heat or cold intolerance; No hair loss  MUSCULOSKELETAL: No joint pain or swelling; No muscle, back, or extremity pain  PSYCHIATRIC: No depression, anxiety, mood swings, or difficulty sleeping  HEME/LYMPH: No easy bruising, or bleeding gums  ALLERGY AND IMMUNOLOGIC: No hives or eczema	    PHYSICAL EXAM:  T(C): 36.7 (02-08-19 @ 07:35), Max: 36.9 (02-07-19 @ 19:48)  HR: 55 (02-08-19 @ 07:35) (51 - 72)  BP: 132/85 (02-08-19 @ 07:35) (130/65 - 153/83)  RR: 18 (02-08-19 @ 07:35) (17 - 18)  SpO2: 99% (02-08-19 @ 07:35) (97% - 100%)  Wt(kg): --  I&O's Summary    07 Feb 2019 07:01  -  08 Feb 2019 07:00  --------------------------------------------------------  IN: 236 mL / OUT: 0 mL / NET: 236 mL        Appearance: Normal	  HEENT:   Normal oral mucosa, PERRL, EOMI	  Lymphatic: No lymphadenopathy  Cardiovascular: Normal S1 S2, No JVD, No murmurs, No edema  Respiratory: Lungs clear to auscultation	  Psychiatry: A & O x 3, Mood & affect appropriate  Gastrointestinal:  Soft, Non-tender, + BS	  Skin: No rashes, No ecchymoses, No cyanosis	  Neurologic: Non-focal  Extremities: Normal range of motion, No clubbing, cyanosis or edema  Vascular: Peripheral pulses palpable 2+ bilaterally    MEDICATIONS  (STANDING):  artificial  tears Solution 1 Drop(s) Both EYES daily  aspirin  chewable 81 milliGRAM(s) Oral daily  atorvastatin 40 milliGRAM(s) Oral at bedtime  heparin  Injectable 5000 Unit(s) SubCutaneous every 8 hours  hydrochlorothiazide 12.5 milliGRAM(s) Oral daily  insulin glargine Injectable (LANTUS) 10 Unit(s) SubCutaneous at bedtime  insulin lispro (HumaLOG) corrective regimen sliding scale   SubCutaneous three times a day before meals  levothyroxine 50 MICROGram(s) Oral daily  lisinopril 10 milliGRAM(s) Oral two times a day  potassium chloride    Tablet ER 20 milliEquivalent(s) Oral daily  potassium chloride    Tablet ER 40 milliEquivalent(s) Oral once        	  LABS:	 	                        15.2   11.5  )-----------( 257      ( 07 Feb 2019 06:52 )             46.6     02-08    135  |  99  |  12  ----------------------------<  236<H>  4.0   |  28  |  0.83    Ca    9.1      08 Feb 2019 07:17        Lipid Profile: Cholesterol 176    HDL 32      HgA1c: Hemoglobin A1C, Whole Blood: 13.1 % (02-06 @ 14:03)    TSH: Thyroid Stimulating Hormone, Serum: 21.10 uU/mL (02-08 @ 07:17)  Thyroid Stimulating Hormone, Serum: 19.30 uU/mL (02-06 @ 08:26)      OBSERVATIONS:  Mitral Valve: Normal mitral valve. Mild mitral  regurgitation.  Aortic Root: Normal aortic root.  Aortic Valve: Normal trileaflet aortic valve. Mild aortic  regurgitation.  Left Atrium: Normal left atrium.  LA volume index = 27  cc/m2.  Left Ventricle: Endocardium not well visualized; grossly  normal left ventricular systolic function baaed on limited  views.   Segmental wall motion could not be assessed.  Normal left ventricular internal dimensions and wall  thicknesses.  Right Heart: Normal right atrium. Right ventricle not well  visualized.  Probably normal right ventricular size and  systolic function. There is trace tricuspid regurgitation.  Normal pulmonic valve.  Pericardium/PleuraNormalpericardium with no pericardial  effusion.  Hemodynamic: Incomplete tricuspid regurgitation jet  precludes accurate assessment of pulmonary artery systolic  pressure.  	    EXAM:  US DPLX CAROTIDS COMPL BI                            PROCEDURE DATE:  02/06/2019          INTERPRETATION:  Carotid duplex Doppler ultrasound    Indication: Visual disturbance    Carotid duplex Doppler ultrasound is performed. Grayscale ultrasound   demonstrates no significant atherosclerotic plaque in the carotid   arteries. The flow velocity measurements during peak systolic phase in   centimeters per second are as the following:    Right CCA 58, ICA 51, ECA 70.  Left CCA 58, ICA 51, ECA 91.    The end diastolic velocity measurement for the right ICA is 16, and  for   the left ICA is 15.    The peak systolic ICA/CCA velocity ratio on the right is 0.9, and on the   left is 0.9.    Both vertebral arteries maintain normal antegrade flow direction.    Irregular arterial pulses are noted.    Impression: No hemodynamically significant internal carotid artery   stenosis by velocity criteria.    Irregular arterial pulses are noted.

## 2019-02-08 NOTE — PROGRESS NOTE ADULT - SUBJECTIVE AND OBJECTIVE BOX
Interval Events:      Allergies    No Known Allergies    Intolerances      Endocrine/Metabolic Medications:  atorvastatin 40 milliGRAM(s) Oral at bedtime  insulin glargine Injectable (LANTUS) 10 Unit(s) SubCutaneous at bedtime  insulin lispro (HumaLOG) corrective regimen sliding scale   SubCutaneous three times a day before meals  levothyroxine 50 MICROGram(s) Oral daily      Vital Signs Last 24 Hrs  T(C): 36.7 (08 Feb 2019 07:35), Max: 36.9 (07 Feb 2019 19:48)  T(F): 98 (08 Feb 2019 07:35), Max: 98.5 (07 Feb 2019 19:48)  HR: 55 (08 Feb 2019 07:35) (51 - 72)  BP: 132/85 (08 Feb 2019 07:35) (130/65 - 153/83)  BP(mean): --  RR: 18 (08 Feb 2019 07:35) (17 - 18)  SpO2: 99% (08 Feb 2019 07:35) (97% - 100%)      PHYSICAL EXAM  All physical exam findings normal, except those marked:  General:	Alert, active, cooperative, NAD, well hydrated  .		[] Abnormal:  Neck		Normal: supple, no cervical adenopathy, no palpable thyroid  .		[] Abnormal:  Cardiovascular	Normal: regular rate, normal S1, S2, no murmurs  .		[] Abnormal:  Respiratory	Normal: no chest wall deformity, normal respiratory pattern, CTA B/L  .		[] Abnormal:  Abdominal	Normal: soft, ND, NT, bowel sounds present, no masses, no organomegaly  .		[] Abnormal:  		Normal normal genitalia, testes descended, circumcised/uncircumcised  .		Katherin stage:			Breast katherin:  .		Menstrual history:  .		[] Abnormal:  Extremities	Normal: FROM x4  .		[] Abnormal:  Skin		Normal: intact and not indurated, no rash, no acanthosis nigricans  .		[] Abnormal:  Neurologic	Normal: grossly intact  .		[] Abnormal:    LABS                              135    |  99     |  12                  Calcium: 9.1   / iCa: x      (02-08 @ 07:17)    ----------------------------<  236       Magnesium: x                                4.0     |  28     |  0.83             Phosphorous: x          CAPILLARY BLOOD GLUCOSE      POCT Blood Glucose.: 230 mg/dL (08 Feb 2019 07:51)  POCT Blood Glucose.: 227 mg/dL (07 Feb 2019 20:48)  POCT Blood Glucose.: 227 mg/dL (07 Feb 2019 16:43)  POCT Blood Glucose.: 240 mg/dL (07 Feb 2019 11:49)        Assesment/plan Interval Events:  pt in nad    Allergies    No Known Allergies    Intolerances      Endocrine/Metabolic Medications:  atorvastatin 40 milliGRAM(s) Oral at bedtime  insulin glargine Injectable (LANTUS) 10 Unit(s) SubCutaneous at bedtime  insulin lispro (HumaLOG) corrective regimen sliding scale   SubCutaneous three times a day before meals  levothyroxine 50 MICROGram(s) Oral daily      Vital Signs Last 24 Hrs  T(C): 36.7 (08 Feb 2019 07:35), Max: 36.9 (07 Feb 2019 19:48)  T(F): 98 (08 Feb 2019 07:35), Max: 98.5 (07 Feb 2019 19:48)  HR: 55 (08 Feb 2019 07:35) (51 - 72)  BP: 132/85 (08 Feb 2019 07:35) (130/65 - 153/83)  BP(mean): --  RR: 18 (08 Feb 2019 07:35) (17 - 18)  SpO2: 99% (08 Feb 2019 07:35) (97% - 100%)      PHYSICAL EXAM  All physical exam findings normal, except those marked:  General:	Alert, active, cooperative, NAD, well hydrated  .		[] Abnormal:  Neck		Normal: supple, no cervical adenopathy, no palpable thyroid  .		[] Abnormal:  Cardiovascular	Normal: regular rate, normal S1, S2, no murmurs  .		[] Abnormal:  Respiratory	Normal: no chest wall deformity, normal respiratory pattern, CTA B/L  .		[] Abnormal:  Abdominal	Normal: soft, ND, NT, bowel sounds present, no masses, no organomegaly  .		[] Abnormal:  		Normal normal genitalia, testes descended, circumcised/uncircumcised  .		Katherin stage:			Breast katherin:  .		Menstrual history:  .		[] Abnormal:  Extremities	Normal: FROM x4  .		[] Abnormal:  Skin		Normal: intact and not indurated, no rash, no acanthosis nigricans  .		[] Abnormal:  Neurologic	Normal: grossly intact  .		[] Abnormal:    LABS                              135    |  99     |  12                  Calcium: 9.1   / iCa: x      (02-08 @ 07:17)    ----------------------------<  236       Magnesium: x                                4.0     |  28     |  0.83             Phosphorous: x          CAPILLARY BLOOD GLUCOSE      POCT Blood Glucose.: 230 mg/dL (08 Feb 2019 07:51)  POCT Blood Glucose.: 227 mg/dL (07 Feb 2019 20:48)  POCT Blood Glucose.: 227 mg/dL (07 Feb 2019 16:43)  POCT Blood Glucose.: 240 mg/dL (07 Feb 2019 11:49)        Assesment/plan    Dm- better controlled  change insulin to metformin 500 bid  and amaryl 4mg qd  fine tuning as out pt    Hypothyroidism- start lt4 50 mcg

## 2019-07-10 PROCEDURE — 36415 COLL VENOUS BLD VENIPUNCTURE: CPT

## 2019-07-10 PROCEDURE — 84436 ASSAY OF TOTAL THYROXINE: CPT

## 2019-07-10 PROCEDURE — 82962 GLUCOSE BLOOD TEST: CPT

## 2019-07-10 PROCEDURE — 93005 ELECTROCARDIOGRAM TRACING: CPT

## 2019-07-10 PROCEDURE — 85027 COMPLETE CBC AUTOMATED: CPT

## 2019-07-10 PROCEDURE — 86803 HEPATITIS C AB TEST: CPT

## 2019-07-10 PROCEDURE — 80053 COMPREHEN METABOLIC PANEL: CPT

## 2019-07-10 PROCEDURE — 93306 TTE W/DOPPLER COMPLETE: CPT

## 2019-07-10 PROCEDURE — 86850 RBC ANTIBODY SCREEN: CPT

## 2019-07-10 PROCEDURE — 84480 ASSAY TRIIODOTHYRONINE (T3): CPT

## 2019-07-10 PROCEDURE — 86900 BLOOD TYPING SEROLOGIC ABO: CPT

## 2019-07-10 PROCEDURE — 83735 ASSAY OF MAGNESIUM: CPT

## 2019-07-10 PROCEDURE — 84100 ASSAY OF PHOSPHORUS: CPT

## 2019-07-10 PROCEDURE — 85610 PROTHROMBIN TIME: CPT

## 2019-07-10 PROCEDURE — 82550 ASSAY OF CK (CPK): CPT

## 2019-07-10 PROCEDURE — 99285 EMERGENCY DEPT VISIT HI MDM: CPT | Mod: 25

## 2019-07-10 PROCEDURE — 84484 ASSAY OF TROPONIN QUANT: CPT

## 2019-07-10 PROCEDURE — 84443 ASSAY THYROID STIM HORMONE: CPT

## 2019-07-10 PROCEDURE — 86901 BLOOD TYPING SEROLOGIC RH(D): CPT

## 2019-07-10 PROCEDURE — 93880 EXTRACRANIAL BILAT STUDY: CPT

## 2019-07-10 PROCEDURE — 82553 CREATINE MB FRACTION: CPT

## 2019-07-10 PROCEDURE — 80048 BASIC METABOLIC PNL TOTAL CA: CPT

## 2019-07-10 PROCEDURE — 70450 CT HEAD/BRAIN W/O DYE: CPT

## 2019-07-10 PROCEDURE — 80061 LIPID PANEL: CPT

## 2019-07-10 PROCEDURE — 85730 THROMBOPLASTIN TIME PARTIAL: CPT

## 2019-07-10 PROCEDURE — 83036 HEMOGLOBIN GLYCOSYLATED A1C: CPT

## 2019-09-23 ENCOUNTER — EMERGENCY (EMERGENCY)
Facility: HOSPITAL | Age: 54
LOS: 1 days | Discharge: HOME | End: 2019-09-23
Attending: EMERGENCY MEDICINE
Payer: MEDICAID

## 2019-09-23 VITALS
WEIGHT: 143.96 LBS | TEMPERATURE: 99 F | OXYGEN SATURATION: 99 % | SYSTOLIC BLOOD PRESSURE: 175 MMHG | HEART RATE: 82 BPM | HEIGHT: 60 IN | RESPIRATION RATE: 20 BRPM | DIASTOLIC BLOOD PRESSURE: 91 MMHG

## 2019-09-23 DIAGNOSIS — Z95.5 PRESENCE OF CORONARY ANGIOPLASTY IMPLANT AND GRAFT: Chronic | ICD-10-CM

## 2019-09-23 PROBLEM — I10 ESSENTIAL (PRIMARY) HYPERTENSION: Chronic | Status: ACTIVE | Noted: 2019-02-05

## 2019-09-23 PROBLEM — G51.0 BELL'S PALSY: Chronic | Status: ACTIVE | Noted: 2019-02-05

## 2019-09-23 PROBLEM — I21.9 ACUTE MYOCARDIAL INFARCTION, UNSPECIFIED: Chronic | Status: ACTIVE | Noted: 2019-02-05

## 2019-09-23 PROBLEM — I25.10 ATHEROSCLEROTIC HEART DISEASE OF NATIVE CORONARY ARTERY WITHOUT ANGINA PECTORIS: Chronic | Status: ACTIVE | Noted: 2019-02-05

## 2019-09-23 PROCEDURE — 70450 CT HEAD/BRAIN W/O DYE: CPT | Mod: 26

## 2019-09-23 PROCEDURE — 70450 CT HEAD/BRAIN W/O DYE: CPT

## 2019-09-23 PROCEDURE — 99284 EMERGENCY DEPT VISIT MOD MDM: CPT | Mod: 25

## 2019-09-23 PROCEDURE — 82962 GLUCOSE BLOOD TEST: CPT

## 2019-09-23 PROCEDURE — 99284 EMERGENCY DEPT VISIT MOD MDM: CPT

## 2019-09-23 RX ADMIN — Medication 60 MILLIGRAM(S): at 18:06

## 2019-09-23 NOTE — ED PROVIDER NOTE - CHIEF COMPLAINT
The patient is a 54y Female complaining of facial drop. The patient is a 54y Female complaining of facial droop.

## 2019-09-23 NOTE — ED PROVIDER NOTE - CRANIAL NERVE AND PUPILLARY EXAM
extra-ocular movements intact/cough reflex intact/corneal reflex intact/tongue is midline/R eye and mouth droop./central vision intact/peripheral vision intact/gag reflex intact

## 2019-09-23 NOTE — ED ADULT NURSE NOTE - OBJECTIVE STATEMENT
patient presents to the Ed c/o right facial droop and slurred speech since yesterday, denies headache, chest pain, nausea and vomiting. no Upper and lower extremity drift. denies trauma. h/o bell's palsy novemeber 2017.

## 2019-09-23 NOTE — ED PROVIDER NOTE - PATIENT PORTAL LINK FT
You can access the FollowMyHealth Patient Portal offered by [facility] by registering at the following website: [email or web address]. By joining [facility]’s FollowMyHealth portal, you will also be able to view your health information using other applications (apps) compatible with our system.

## 2019-09-23 NOTE — ED PROVIDER NOTE - PMH
Bell's palsy    Coronary artery disease  4 cardiac stents  DM (diabetes mellitus)    Hypertension    Hypothyroid    Myocardial infarction

## 2019-09-23 NOTE — ED PROVIDER NOTE - OBJECTIVE STATEMENT
55 y/o with PMHx of DM, CAD, HTN, Hypothyroid, [place] Palsy and PSHx of Coronary Stent presenting to ED with R facial droop noticed last night. Pt reports that her sxs are similar to an episode earlier this year and 2 years ago. Pt denies HA, dizziness, weakness, recent trauma, fever or any other acute complaints. NKDA.

## 2019-09-23 NOTE — ED PROVIDER NOTE - CLINICAL SUMMARY MEDICAL DECISION MAKING FREE TEXT BOX
53 y/o pt presenting to ED with R facial droop with a h/o bells palsy. Pt's sxs consistent with bells palsy. Will tx with steroids and PMD F/u.

## 2019-09-23 NOTE — ED ADULT NURSE NOTE - CHPI ED NUR SYMPTOMS NEG
no disorientation/no suicidal/no hallucinations/no paranoia/no fever/no weight loss/no agitation/no change in level of consciousness/no weakness

## 2019-10-01 ENCOUNTER — OUTPATIENT (OUTPATIENT)
Dept: OUTPATIENT SERVICES | Facility: HOSPITAL | Age: 54
LOS: 1 days | End: 2019-10-01
Payer: MEDICAID

## 2019-10-01 DIAGNOSIS — Z95.5 PRESENCE OF CORONARY ANGIOPLASTY IMPLANT AND GRAFT: Chronic | ICD-10-CM

## 2019-10-01 PROCEDURE — G9001: CPT

## 2019-10-15 DIAGNOSIS — Z71.89 OTHER SPECIFIED COUNSELING: ICD-10-CM

## 2019-10-15 PROBLEM — E11.9 TYPE 2 DIABETES MELLITUS WITHOUT COMPLICATIONS: Chronic | Status: ACTIVE | Noted: 2019-09-27

## 2019-10-15 PROBLEM — E03.9 HYPOTHYROIDISM, UNSPECIFIED: Chronic | Status: ACTIVE | Noted: 2019-09-27

## 2020-10-13 NOTE — H&P ADULT - CRANIAL NERVE
FAMILY HISTORY:  FH: CAD (coronary artery disease), inmother    
right sided horizontal gaze nystagmus; right sided facial droop; left sided eyelid drooping; otherwise intact

## 2021-04-08 NOTE — DIETITIAN INITIAL EVALUATION ADULT. - PROBLEM SELECTOR PLAN 4
Body Location Override (Optional - Billing Will Still Be Based On Selected Body Map Location If Applicable): right medial buttock Detail Level: Simple Depth Of Biopsy: dermis Was A Bandage Applied: Yes Size Of Lesion In Cm: 0 Anticipated Plan (Based On Presumed Biopsy Results): P Biopsy Type: H and E Biopsy Method: Personna blade Anesthesia Type: 1% lidocaine with 1:100,000 epinephrine and a 1:10 solution of 8.4% sodium bicarbonate Anesthesia Volume In Cc (Will Not Render If 0): 0.3 Hemostasis: Drysol Wound Care: Petrolatum Dressing: bandage Destruction After The Procedure: No Type Of Destruction Used: Curettage Curettage Text: The wound bed was treated with curettage after the biopsy was performed. Cryotherapy Text: The wound bed was treated with cryotherapy after the biopsy was performed. Electrodesiccation Text: The wound bed was treated with electrodesiccation after the biopsy was performed. Electrodesiccation And Curettage Text: The wound bed was treated with electrodesiccation and curettage after the biopsy was performed. Silver Nitrate Text: The wound bed was treated with silver nitrate after the biopsy was performed. Lab: [facility] Lab Facility: [address] Consent: Written consent was obtained and risks were reviewed including but not limited to scarring, infection, bleeding, scabbing, incomplete removal, nerve damage and allergy to anesthesia. Post-Care Instructions: I reviewed with the patient in detail post-care instructions. Patient is to keep the biopsy site clean and apply vaseline twice daily until healed. Notification Instructions: Patient will be notified of biopsy results. However, patient instructed to call the office if not contacted within 2 weeks. Billing Type: [employer] Information: Selecting Yes will display possible errors in your note based on the variables you have selected. This validation is only offered as a suggestion for you. PLEASE NOTE THAT THE VALIDATION TEXT WILL BE REMOVED WHEN YOU FINALIZE YOUR NOTE. IF YOU WANT TO FAX A PRELIMINARY NOTE YOU WILL NEED TO TOGGLE THIS TO 'NO' IF YOU DO NOT WANT IT IN YOUR FAXED NOTE. per MD
